# Patient Record
Sex: FEMALE | Race: BLACK OR AFRICAN AMERICAN | NOT HISPANIC OR LATINO | URBAN - METROPOLITAN AREA
[De-identification: names, ages, dates, MRNs, and addresses within clinical notes are randomized per-mention and may not be internally consistent; named-entity substitution may affect disease eponyms.]

---

## 2023-03-06 ENCOUNTER — TELEPHONE (OUTPATIENT)
Dept: PRIMARY CARE | Facility: CLINIC | Age: 73
End: 2023-03-06
Payer: MEDICARE

## 2023-03-13 ENCOUNTER — TELEPHONE (OUTPATIENT)
Dept: PRIMARY CARE | Facility: CLINIC | Age: 73
End: 2023-03-13
Payer: MEDICARE

## 2023-03-14 PROBLEM — R73.09 OTHER ABNORMAL GLUCOSE: Status: ACTIVE | Noted: 2023-03-14

## 2023-03-14 PROBLEM — E78.5 HYPERLIPIDEMIA WITH LOW HDL: Status: ACTIVE | Noted: 2023-03-14

## 2023-03-14 PROBLEM — N95.8 OTHER SPECIFIED MENOPAUSAL AND PERIMENOPAUSAL DISORDERS: Status: ACTIVE | Noted: 2023-03-14

## 2023-03-14 PROBLEM — E55.9 VITAMIN D INSUFFICIENCY: Status: ACTIVE | Noted: 2023-03-14

## 2023-03-14 PROBLEM — R59.1 LYMPHADENOPATHY: Status: ACTIVE | Noted: 2023-03-14

## 2023-03-14 PROBLEM — M81.0 OSTEOPOROSIS: Status: ACTIVE | Noted: 2023-03-14

## 2023-03-14 PROBLEM — N64.89 BREAST ASYMMETRY: Status: ACTIVE | Noted: 2023-03-14

## 2023-03-14 PROBLEM — R91.8 LUNG NODULE, MULTIPLE: Status: ACTIVE | Noted: 2023-03-14

## 2023-03-14 PROBLEM — I10 HYPERTENSION, ESSENTIAL: Status: ACTIVE | Noted: 2023-03-14

## 2023-03-14 PROBLEM — E78.6 HYPERLIPIDEMIA WITH LOW HDL: Status: ACTIVE | Noted: 2023-03-14

## 2023-03-14 RX ORDER — ATORVASTATIN CALCIUM 20 MG/1
20 TABLET, FILM COATED ORAL NIGHTLY
COMMUNITY
Start: 2022-11-24 | End: 2023-03-15 | Stop reason: SINTOL

## 2023-03-14 RX ORDER — LISINOPRIL 2.5 MG/1
2.5 TABLET ORAL DAILY
COMMUNITY
End: 2023-03-15 | Stop reason: SDUPTHER

## 2023-03-14 RX ORDER — AMLODIPINE BESYLATE 10 MG/1
10 TABLET ORAL DAILY
COMMUNITY
End: 2023-03-15 | Stop reason: SINTOL

## 2023-03-14 RX ORDER — ACETAMINOPHEN 500 MG
TABLET ORAL
COMMUNITY

## 2023-03-14 RX ORDER — MULTIVITAMIN
1 TABLET ORAL DAILY
COMMUNITY
Start: 2015-01-27 | End: 2023-10-12 | Stop reason: ALTCHOICE

## 2023-03-14 NOTE — PROGRESS NOTES
"Subjective   Patient ID: Lexx Mitchell is a 72 y.o. female who presents for Dizziness.    Went to  10 days ago    Stopped Amlodipine and Atorvastatin for side effects    Dizziness  This is a new problem. The current episode started 1 to 4 weeks ago. The problem occurs 2 to 4 times per day. The problem has been gradually improving. Nothing aggravates the symptoms. She has tried drinking for the symptoms. The treatment provided mild relief.        Review of Systems  Constitutional: No fever or chills  Cardiovascular: no chest pain, no palpitations and no syncope.   Respiratory: no cough, no shortness of breath during exertion and no shortness of breath at rest.   Gastrointestinal: no abdominal pain, no nausea and no vomiting.  Neuro: No Headache, no dizziness    Objective   /82   Pulse 83   Ht 1.619 m (5' 3.75\")   Wt 66.7 kg (147 lb)   SpO2 94%   BMI 25.43 kg/m²     Physical Exam  Constitutional: Alert and in no acute distress. Well developed, well nourished  Head and Face: Head and face: Normal.    Cardiovascular: Heart rate and rhythm were normal, normal S1 and S2. No peripheral edema.   Pulmonary: No respiratory distress. Clear bilateral breath sounds.  Musculoskeletal: Gait and station: Normal. Muscle strength/tone: Normal.   Skin: Normal skin color and pigmentation, normal skin turgor, and no rash.    Psychiatric: Judgment and insight: Intact. Mood and affect: Normal.    Assessment/Plan   Diagnoses and all orders for this visit:  Hypertension, essential  -     Comprehensive Metabolic Panel; Future  -     Follow Up In [facility] - PCP; Future  -     lisinopril 2.5 mg tablet; Take 1 tablet (2.5 mg) by mouth once daily.  Hyperlipidemia with low HDL  -     rosuvastatin (Crestor) 5 mg tablet; Take 1 tablet (5 mg) by mouth every other day.  -     Lipid Panel; Future        Dear Lexx Mitchell     It was my pleasure to take care of you today in the office. Below are the things we discussed " today:    1. HTN - Stop Amlodipine and continue Lisinopril    2. Dizziness - Improved will stay off amlodipine and maintain good oral hydration    3. Hyperlipidemia - Stop Atorvastatin, start rosuvastatin      Follow up in 2 months    Your yearly Physical is due in: Nov 2023  When you call the office for your yearly Physical, please ask them to inform me to order your blood work, so that you can get the fasting blood work before your appointment and we can discuss the results at your physical.      Please call me if any questions arise from now until your next visit. I will call you after I am done seeing patients. A Doctor is always available by phone when the office is closed. Please feel free to call for help with any problem that you feel shouldn't wait until the office re-opens.     Gabbi Tran MD

## 2023-03-15 ENCOUNTER — OFFICE VISIT (OUTPATIENT)
Dept: PRIMARY CARE | Facility: CLINIC | Age: 73
End: 2023-03-15
Payer: MEDICARE

## 2023-03-15 VITALS
WEIGHT: 147 LBS | BODY MASS INDEX: 25.1 KG/M2 | SYSTOLIC BLOOD PRESSURE: 137 MMHG | HEART RATE: 83 BPM | OXYGEN SATURATION: 94 % | DIASTOLIC BLOOD PRESSURE: 82 MMHG | HEIGHT: 64 IN

## 2023-03-15 DIAGNOSIS — E78.6 HYPERLIPIDEMIA WITH LOW HDL: ICD-10-CM

## 2023-03-15 DIAGNOSIS — E78.5 HYPERLIPIDEMIA WITH LOW HDL: ICD-10-CM

## 2023-03-15 DIAGNOSIS — I10 HYPERTENSION, ESSENTIAL: Primary | ICD-10-CM

## 2023-03-15 PROCEDURE — 3079F DIAST BP 80-89 MM HG: CPT | Performed by: FAMILY MEDICINE

## 2023-03-15 PROCEDURE — 99214 OFFICE O/P EST MOD 30 MIN: CPT | Performed by: FAMILY MEDICINE

## 2023-03-15 PROCEDURE — 3075F SYST BP GE 130 - 139MM HG: CPT | Performed by: FAMILY MEDICINE

## 2023-03-15 RX ORDER — ROSUVASTATIN CALCIUM 5 MG/1
5 TABLET, COATED ORAL EVERY OTHER DAY
Qty: 45 TABLET | Refills: 1 | Status: SHIPPED | OUTPATIENT
Start: 2023-03-15 | End: 2023-03-28

## 2023-03-15 RX ORDER — LISINOPRIL 2.5 MG/1
2.5 TABLET ORAL DAILY
Qty: 90 TABLET | Refills: 0 | Status: SHIPPED | OUTPATIENT
Start: 2023-03-15 | End: 2023-05-31

## 2023-03-15 ASSESSMENT — PATIENT HEALTH QUESTIONNAIRE - PHQ9
1. LITTLE INTEREST OR PLEASURE IN DOING THINGS: NOT AT ALL
2. FEELING DOWN, DEPRESSED OR HOPELESS: NOT AT ALL
SUM OF ALL RESPONSES TO PHQ9 QUESTIONS 1 AND 2: 0

## 2023-03-15 ASSESSMENT — ENCOUNTER SYMPTOMS
LOSS OF SENSATION IN FEET: 0
OCCASIONAL FEELINGS OF UNSTEADINESS: 0
DEPRESSION: 0
DIZZINESS: 1

## 2023-03-27 ENCOUNTER — APPOINTMENT (OUTPATIENT)
Dept: PRIMARY CARE | Facility: CLINIC | Age: 73
End: 2023-03-27
Payer: MEDICARE

## 2023-03-28 ENCOUNTER — OFFICE VISIT (OUTPATIENT)
Dept: PRIMARY CARE | Facility: CLINIC | Age: 73
End: 2023-03-28
Payer: MEDICARE

## 2023-03-28 VITALS
SYSTOLIC BLOOD PRESSURE: 130 MMHG | HEART RATE: 81 BPM | WEIGHT: 149 LBS | DIASTOLIC BLOOD PRESSURE: 82 MMHG | BODY MASS INDEX: 25.78 KG/M2 | OXYGEN SATURATION: 98 %

## 2023-03-28 DIAGNOSIS — J31.0 CHRONIC RHINITIS: Primary | ICD-10-CM

## 2023-03-28 DIAGNOSIS — Z12.11 COLON CANCER SCREENING: ICD-10-CM

## 2023-03-28 DIAGNOSIS — R92.2 INCONCLUSIVE MAMMOGRAM: ICD-10-CM

## 2023-03-28 DIAGNOSIS — I10 PRIMARY HYPERTENSION: ICD-10-CM

## 2023-03-28 DIAGNOSIS — N63.10 MASSES OF BOTH BREASTS: ICD-10-CM

## 2023-03-28 DIAGNOSIS — N63.20 MASSES OF BOTH BREASTS: ICD-10-CM

## 2023-03-28 PROCEDURE — 99204 OFFICE O/P NEW MOD 45 MIN: CPT | Performed by: STUDENT IN AN ORGANIZED HEALTH CARE EDUCATION/TRAINING PROGRAM

## 2023-03-28 PROCEDURE — 3079F DIAST BP 80-89 MM HG: CPT | Performed by: STUDENT IN AN ORGANIZED HEALTH CARE EDUCATION/TRAINING PROGRAM

## 2023-03-28 PROCEDURE — 3075F SYST BP GE 130 - 139MM HG: CPT | Performed by: STUDENT IN AN ORGANIZED HEALTH CARE EDUCATION/TRAINING PROGRAM

## 2023-03-28 RX ORDER — AMLODIPINE BESYLATE 5 MG/1
5 TABLET ORAL DAILY
Qty: 90 TABLET | Refills: 1 | Status: SHIPPED | OUTPATIENT
Start: 2023-03-28 | End: 2023-10-04 | Stop reason: SDUPTHER

## 2023-03-28 RX ORDER — TRIAMCINOLONE ACETONIDE 55 UG/1
1 SPRAY, METERED NASAL DAILY
Qty: 10.8 ML | Refills: 3 | Status: ON HOLD | OUTPATIENT
Start: 2023-03-28 | End: 2023-11-13 | Stop reason: WASHOUT

## 2023-03-28 NOTE — PATIENT INSTRUCTIONS
Thank you for coming in today Lexx!    Please stop your statin. We will repeat the calcium score cardiac CT next year.    Please take lisinopril 5mg in the morning and amlodipine 5mg in the evening. Let me know if you have side effects.    Please get your mammogram/ultrasound scheduled as well as your colonoscopy.     You can get Tdap vaccine here or at the pharmacy.    I will see you in August or sooner if needed!

## 2023-03-28 NOTE — PROGRESS NOTES
Subjective   Patient ID: Lexx Mitchell is a 72 y.o. female who presents for Establish Care, Vomiting, Dizziness, and Shortness of Breath. Seen with sister.     HPI  Concerns today:  States she has had a month of issues that she thinks are related to medications. Started off on atorvastatin and had vomiting and diarrhea. Then started amlodipine, and started to develop wooziness and pressure and lightheadedness. Has had this two or three times a week. At night, would have a hard time breathing. Cannot lay flat. Feels like the issues with her breathing are from her throat up. She is worried it might have to do with her sinuses. Humidifier is helping a little bit. No swelling in legs.    Chronic issues:  #HTN  -On lisinopril 5 mg daily   -BP running in 140s-150s- at home  -Initially 184/80 when she arrived today, improved on repeat to 130/82  -Calcium score 0 on 5/3/2021    Health maintenance:  -Planning on going on Mediterranean diet and working out more   -Needs to go back for mammogram   -Due for colonoscopy   -Lung cancer screening in Dec 2022 showed scattered pulmonary nodules measuring up to 5mm- recommended follow up in 12 months  [ ] Due for Tdap -- plans to get at pharmacy   -Shingles, pneumonia vaccines up to date  -DEXA 4/24/21 showed osteoporosis of spine, osteopenia elsewhere    Social history:  -Lives alone  -Sister, aunt, cousin in [place]  -Sister in [place]   -Quit smoking in February 2023  -Occasional alcohol once a month  -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  -Gardening, yoga, walking     Objective   Visit Vitals  BP (!) 184/80   Pulse 81   Wt 67.6 kg (149 lb)   SpO2 98%   BMI 25.78 kg/m²   OB Status Postmenopausal   Smoking Status Former   BSA 1.74 m²      Physical Exam  General: Well appearing, conversational, in no acute distress  HEENT: EOMI, PERRL, mild nasal congestion, MMM, TMs clear bilaterally   CV: RRR, no murmurs  Resp: Lungs CTAB, normal work of breathing  GI: Soft, nondistended,  nontender, BS+   Ext: No lower ext swelling  Skin: Warm, dry, no rashes  Neuro: Awake, alert, oriented x3, moving all 4 extremities, nonfocal, normal gait, ambulates without assistance  Psych: Appropriate mood and affect      Assessment/Plan   Lexx Mitchell is a 72 y.o. female who presents for Establish Care, Vomiting, Dizziness, and Shortness of Breath. Patient is overall well appearing. She thinks she may have had side effects of nausea and dizziness from statins, so will hold for now since her calcium score was 0 a couple years ago. Will repeat a calcium score when she gets her CT lung cancer screening in September. Her blood pressure is still elevated at home, so will try lisinopril 5mg and amlodipine 5mg since she prefers to be on low doses of the medications. She is having trouble breathing out of her nose at night. Her humidifier is helping some, so will add a nasal spray as well.     Chronic issues:  #HTN  -On lisinopril 5 mg daily   -BP running in 140s-150s- at home  -Initially 184/80 when she arrived today, improved on repeat to 130/82  -Add amlodipine 5mg     Health maintenance:  -Needs to go back for mammogram -- ordered  -Due for colonoscopy -- ordered   -Lung cancer screening in Dec 2022 showed scattered pulmonary nodules measuring up to 5mm- recommended follow up in 12 months  - Repeat calcium score cardiac CT in Dec as well  [ ] Due for Tdap -- plans to get at pharmacy   -Shingles, pneumonia vaccines up to date  -DEXA 4/24/21 showed osteoporosis of spine, osteopenia elsewhere- patient was prescribed alendronate previously  -Will repeat DEXA next year       Problem List Items Addressed This Visit    None  Visit Diagnoses       Chronic rhinitis    -  Primary    Relevant Medications    triamcinolone (Nasacort) 55 mcg nasal inhaler    Primary hypertension        Relevant Medications    amLODIPine (Norvasc) 5 mg tablet    Colon cancer screening        Relevant Orders    Colonoscopy    Masses of both  breasts        Relevant Orders    BI mammo bilateral diagnostic    BI US breast complete bilateral    Inconclusive mammogram        Relevant Orders    BI mammo bilateral diagnostic    BI US breast complete bilateral            Lisinopril 5mg daily  Has not started rosuvastatin  Cardiac calcium score 0 in 2021    Hold off on statin    Planning on going on mediterranean diet         Vanessa Quinn MD MPH

## 2023-04-25 ENCOUNTER — OFFICE VISIT (OUTPATIENT)
Dept: PRIMARY CARE | Facility: CLINIC | Age: 73
End: 2023-04-25
Payer: MEDICARE

## 2023-04-25 VITALS
WEIGHT: 152 LBS | BODY MASS INDEX: 26.3 KG/M2 | DIASTOLIC BLOOD PRESSURE: 70 MMHG | HEART RATE: 92 BPM | SYSTOLIC BLOOD PRESSURE: 132 MMHG | OXYGEN SATURATION: 98 %

## 2023-04-25 DIAGNOSIS — T30.0 BURN: Primary | ICD-10-CM

## 2023-04-25 PROCEDURE — 3078F DIAST BP <80 MM HG: CPT | Performed by: STUDENT IN AN ORGANIZED HEALTH CARE EDUCATION/TRAINING PROGRAM

## 2023-04-25 PROCEDURE — 3075F SYST BP GE 130 - 139MM HG: CPT | Performed by: STUDENT IN AN ORGANIZED HEALTH CARE EDUCATION/TRAINING PROGRAM

## 2023-04-25 PROCEDURE — 99213 OFFICE O/P EST LOW 20 MIN: CPT | Performed by: STUDENT IN AN ORGANIZED HEALTH CARE EDUCATION/TRAINING PROGRAM

## 2023-04-25 RX ORDER — SILVER SULFADIAZINE 10 G/1000G
CREAM TOPICAL
Qty: 50 G | Refills: 0 | Status: SHIPPED | OUTPATIENT
Start: 2023-04-25 | End: 2023-06-24
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
## 2023-05-08 ENCOUNTER — APPOINTMENT (OUTPATIENT)
Dept: PRIMARY CARE | Facility: CLINIC | Age: 73
End: 2023-05-08
Payer: MEDICARE

## 2023-05-31 DIAGNOSIS — I10 HYPERTENSION, ESSENTIAL: ICD-10-CM

## 2023-05-31 RX ORDER — LISINOPRIL 2.5 MG/1
TABLET ORAL
Qty: 90 TABLET | Refills: 0 | Status: SHIPPED | OUTPATIENT
Start: 2023-05-31 | End: 2023-08-22

## 2023-06-29 DIAGNOSIS — R92.8 OTHER ABNORMAL AND INCONCLUSIVE FINDINGS ON DIAGNOSTIC IMAGING OF BREAST: ICD-10-CM

## 2023-06-29 DIAGNOSIS — N63.10 MASSES OF BOTH BREASTS: Primary | ICD-10-CM

## 2023-06-29 DIAGNOSIS — N63.20 MASSES OF BOTH BREASTS: Primary | ICD-10-CM

## 2023-08-07 ENCOUNTER — OFFICE VISIT (OUTPATIENT)
Dept: PRIMARY CARE | Facility: CLINIC | Age: 73
End: 2023-08-07
Payer: MEDICARE

## 2023-08-07 VITALS
HEART RATE: 95 BPM | DIASTOLIC BLOOD PRESSURE: 80 MMHG | OXYGEN SATURATION: 98 % | SYSTOLIC BLOOD PRESSURE: 132 MMHG | WEIGHT: 150 LBS | BODY MASS INDEX: 25.95 KG/M2

## 2023-08-07 DIAGNOSIS — N18.2 CKD (CHRONIC KIDNEY DISEASE) STAGE 2, GFR 60-89 ML/MIN: ICD-10-CM

## 2023-08-07 DIAGNOSIS — Z01.818 PREOPERATIVE CLEARANCE: Primary | ICD-10-CM

## 2023-08-07 DIAGNOSIS — Z71.6 ENCOUNTER FOR SMOKING CESSATION COUNSELING: ICD-10-CM

## 2023-08-07 DIAGNOSIS — E78.6 HYPERLIPIDEMIA WITH LOW HDL: ICD-10-CM

## 2023-08-07 DIAGNOSIS — E78.5 HYPERLIPIDEMIA WITH LOW HDL: ICD-10-CM

## 2023-08-07 DIAGNOSIS — Z78.9 STATIN INTOLERANCE: ICD-10-CM

## 2023-08-07 DIAGNOSIS — R79.9 ABNORMAL FINDING OF BLOOD CHEMISTRY, UNSPECIFIED: ICD-10-CM

## 2023-08-07 DIAGNOSIS — E55.9 VITAMIN D DEFICIENCY: ICD-10-CM

## 2023-08-07 DIAGNOSIS — I10 HYPERTENSION, ESSENTIAL: ICD-10-CM

## 2023-08-07 PROCEDURE — 3075F SYST BP GE 130 - 139MM HG: CPT | Performed by: STUDENT IN AN ORGANIZED HEALTH CARE EDUCATION/TRAINING PROGRAM

## 2023-08-07 PROCEDURE — 3079F DIAST BP 80-89 MM HG: CPT | Performed by: STUDENT IN AN ORGANIZED HEALTH CARE EDUCATION/TRAINING PROGRAM

## 2023-08-07 PROCEDURE — 93000 ELECTROCARDIOGRAM COMPLETE: CPT | Performed by: STUDENT IN AN ORGANIZED HEALTH CARE EDUCATION/TRAINING PROGRAM

## 2023-08-07 PROCEDURE — 99214 OFFICE O/P EST MOD 30 MIN: CPT | Performed by: STUDENT IN AN ORGANIZED HEALTH CARE EDUCATION/TRAINING PROGRAM

## 2023-08-07 NOTE — PATIENT INSTRUCTIONS
Thank you for coming today Lexx!    Please schedule your CT cardiac score to look at calcium in your heart. You can get this done on the lower level in [address]. The phone number is [phone].     Please get your blood work done. The lab is on floor 1 in [address] or on floor LL in [address].     We discussed scheduling your colonoscopy. Please schedule your colonoscopy by calling [phone].     I have made a referral for [facility] for quitting smoking. Their number is [phone]. They are located at [address] [place] [address] near [facility].     I will see you in November for your wellness visit!
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
## 2023-08-22 DIAGNOSIS — I10 HYPERTENSION, ESSENTIAL: ICD-10-CM

## 2023-08-22 RX ORDER — LISINOPRIL 2.5 MG/1
2.5 TABLET ORAL DAILY
Qty: 90 TABLET | Refills: 0 | Status: SHIPPED | OUTPATIENT
Start: 2023-08-22

## 2023-09-20 ENCOUNTER — LAB (OUTPATIENT)
Dept: LAB | Facility: LAB | Age: 73
End: 2023-09-20
Payer: MEDICARE

## 2023-09-20 DIAGNOSIS — E55.9 VITAMIN D DEFICIENCY: ICD-10-CM

## 2023-09-20 DIAGNOSIS — R79.9 ABNORMAL FINDING OF BLOOD CHEMISTRY, UNSPECIFIED: ICD-10-CM

## 2023-09-20 DIAGNOSIS — E78.6 HYPERLIPIDEMIA WITH LOW HDL: ICD-10-CM

## 2023-09-20 DIAGNOSIS — E78.5 HYPERLIPIDEMIA WITH LOW HDL: ICD-10-CM

## 2023-09-20 DIAGNOSIS — Z01.818 PREOPERATIVE CLEARANCE: ICD-10-CM

## 2023-09-20 LAB
ALANINE AMINOTRANSFERASE (SGPT) (U/L) IN SER/PLAS: 7 U/L (ref 7–45)
ALBUMIN (G/DL) IN SER/PLAS: 4.3 G/DL (ref 3.4–5)
ALKALINE PHOSPHATASE (U/L) IN SER/PLAS: 68 U/L (ref 33–136)
ANION GAP IN SER/PLAS: 13 MMOL/L (ref 10–20)
ASPARTATE AMINOTRANSFERASE (SGOT) (U/L) IN SER/PLAS: 13 U/L (ref 9–39)
BILIRUBIN TOTAL (MG/DL) IN SER/PLAS: 0.4 MG/DL (ref 0–1.2)
CALCIDIOL (25 OH VITAMIN D3) (NG/ML) IN SER/PLAS: 27 NG/ML
CALCIUM (MG/DL) IN SER/PLAS: 9.7 MG/DL (ref 8.6–10.6)
CARBON DIOXIDE, TOTAL (MMOL/L) IN SER/PLAS: 27 MMOL/L (ref 21–32)
CHLORIDE (MMOL/L) IN SER/PLAS: 108 MMOL/L (ref 98–107)
CHOLESTEROL (MG/DL) IN SER/PLAS: 249 MG/DL (ref 0–199)
CHOLESTEROL IN HDL (MG/DL) IN SER/PLAS: 34.8 MG/DL
CHOLESTEROL/HDL RATIO: 7.2
CREATININE (MG/DL) IN SER/PLAS: 0.93 MG/DL (ref 0.5–1.05)
ERYTHROCYTE DISTRIBUTION WIDTH (RATIO) BY AUTOMATED COUNT: 17.6 % (ref 11.5–14.5)
ERYTHROCYTE MEAN CORPUSCULAR HEMOGLOBIN CONCENTRATION (G/DL) BY AUTOMATED: 30.7 G/DL (ref 32–36)
ERYTHROCYTE MEAN CORPUSCULAR VOLUME (FL) BY AUTOMATED COUNT: 84 FL (ref 80–100)
ERYTHROCYTES (10*6/UL) IN BLOOD BY AUTOMATED COUNT: 4.67 X10E12/L (ref 4–5.2)
ESTIMATED AVERAGE GLUCOSE FOR HBA1C: 126 MG/DL
GFR FEMALE: 66 ML/MIN/1.73M2
GLUCOSE (MG/DL) IN SER/PLAS: 89 MG/DL (ref 74–99)
HEMATOCRIT (%) IN BLOOD BY AUTOMATED COUNT: 39.1 % (ref 36–46)
HEMOGLOBIN (G/DL) IN BLOOD: 12 G/DL (ref 12–16)
HEMOGLOBIN A1C/HEMOGLOBIN TOTAL IN BLOOD: 6 %
LDL: 187 MG/DL (ref 0–99)
LEUKOCYTES (10*3/UL) IN BLOOD BY AUTOMATED COUNT: 5.3 X10E9/L (ref 4.4–11.3)
NRBC (PER 100 WBCS) BY AUTOMATED COUNT: 0 /100 WBC (ref 0–0)
PLATELETS (10*3/UL) IN BLOOD AUTOMATED COUNT: 276 X10E9/L (ref 150–450)
POTASSIUM (MMOL/L) IN SER/PLAS: 4.3 MMOL/L (ref 3.5–5.3)
PROTEIN TOTAL: 7.6 G/DL (ref 6.4–8.2)
SODIUM (MMOL/L) IN SER/PLAS: 144 MMOL/L (ref 136–145)
TRIGLYCERIDE (MG/DL) IN SER/PLAS: 137 MG/DL (ref 0–149)
UREA NITROGEN (MG/DL) IN SER/PLAS: 11 MG/DL (ref 6–23)
VLDL: 27 MG/DL (ref 0–40)

## 2023-09-20 PROCEDURE — 80053 COMPREHEN METABOLIC PANEL: CPT

## 2023-09-20 PROCEDURE — 80061 LIPID PANEL: CPT

## 2023-09-20 PROCEDURE — 85027 COMPLETE CBC AUTOMATED: CPT

## 2023-09-20 PROCEDURE — 82306 VITAMIN D 25 HYDROXY: CPT

## 2023-09-20 PROCEDURE — 83036 HEMOGLOBIN GLYCOSYLATED A1C: CPT

## 2023-09-20 PROCEDURE — 36415 COLL VENOUS BLD VENIPUNCTURE: CPT

## 2023-09-21 ENCOUNTER — LAB (OUTPATIENT)
Dept: LAB | Facility: LAB | Age: 73
End: 2023-09-21
Payer: MEDICARE

## 2023-09-21 DIAGNOSIS — I10 HYPERTENSION, ESSENTIAL: ICD-10-CM

## 2023-09-21 DIAGNOSIS — N18.2 CKD (CHRONIC KIDNEY DISEASE) STAGE 2, GFR 60-89 ML/MIN: ICD-10-CM

## 2023-09-21 LAB
APPEARANCE, URINE: CLEAR
BILIRUBIN, URINE: NEGATIVE
BLOOD, URINE: ABNORMAL
COLOR, URINE: ABNORMAL
GLUCOSE, URINE: NEGATIVE MG/DL
KETONES, URINE: NEGATIVE MG/DL
LEUKOCYTE ESTERASE, URINE: NEGATIVE
MUCUS, URINE: NORMAL /LPF
NITRITE, URINE: NEGATIVE
PH, URINE: 5 (ref 5–8)
PROTEIN, URINE: NEGATIVE MG/DL
RBC, URINE: 1 /HPF (ref 0–5)
SPECIFIC GRAVITY, URINE: 1 (ref 1–1.03)
SQUAMOUS EPITHELIAL CELLS, URINE: <1 /HPF
UROBILINOGEN, URINE: <2 MG/DL (ref 0–1.9)
WBC, URINE: 1 /HPF (ref 0–5)

## 2023-09-21 PROCEDURE — 81001 URINALYSIS AUTO W/SCOPE: CPT

## 2023-09-25 ENCOUNTER — HOSPITAL ENCOUNTER (OUTPATIENT)
Dept: DATA CONVERSION | Facility: HOSPITAL | Age: 73
End: 2023-09-25
Attending: SURGERY | Admitting: SURGERY
Payer: MEDICARE

## 2023-09-25 DIAGNOSIS — N63.10 UNSPECIFIED LUMP IN THE RIGHT BREAST, UNSPECIFIED QUADRANT: ICD-10-CM

## 2023-09-25 DIAGNOSIS — D05.11 INTRADUCTAL CARCINOMA IN SITU OF RIGHT BREAST: ICD-10-CM

## 2023-09-25 LAB
ANION GAP IN SER/PLAS: 11 MMOL/L (ref 10–20)
CALCIUM (MG/DL) IN SER/PLAS: 9.1 MG/DL (ref 8.6–10.3)
CARBON DIOXIDE, TOTAL (MMOL/L) IN SER/PLAS: 25 MMOL/L (ref 21–32)
CHLORIDE (MMOL/L) IN SER/PLAS: 109 MMOL/L (ref 98–107)
CREATININE (MG/DL) IN SER/PLAS: 0.86 MG/DL (ref 0.5–1.05)
GFR FEMALE: 72 ML/MIN/1.73M2
GLUCOSE (MG/DL) IN SER/PLAS: 101 MG/DL (ref 74–99)
HEMATOCRIT (%) IN BLOOD BY AUTOMATED COUNT: 36.3 % (ref 36–46)
HEMOGLOBIN (G/DL) IN BLOOD: 11.4 G/DL (ref 12–16)
POTASSIUM (MMOL/L) IN SER/PLAS: 3.4 MMOL/L (ref 3.5–5.3)
SODIUM (MMOL/L) IN SER/PLAS: 142 MMOL/L (ref 136–145)
UREA NITROGEN (MG/DL) IN SER/PLAS: 10 MG/DL (ref 6–23)

## 2023-09-29 VITALS
SYSTOLIC BLOOD PRESSURE: 183 MMHG | TEMPERATURE: 97.9 F | DIASTOLIC BLOOD PRESSURE: 77 MMHG | HEART RATE: 68 BPM | RESPIRATION RATE: 18 BRPM

## 2023-09-30 NOTE — H&P
History of Present Illness:   History Present Illness:  Reason for surgery: right breast atypical papillary  lesion   HPI:    73 F with screen detected right breast mass at 9:00, 11cm FN, core needle biopsy showing atypical  papillary lesion, suspicious for low grade DCIS.     Allergies:        Allergies:  ·  No Known Allergies :     Home Medication Review:   Home Medications Reviewed: yes     Impression/Procedure:   ·  Impression and Planned Procedure: right breast Magseed localized partial mastectomy       ERAS (Enhanced Recovery After Surgery):  ·  ERAS Patient: no     Review of Systems:   Review of Systems:  All Other Systems: All other systems reviewed and  are negative       Vital Signs:  Temperature C: 36.6 degrees C   Temperature F: 97.8 degrees F   Heart Rate: 68 beats per minute   Respiratory Rate: 18 breath per minute   Blood Pressure Systolic: 183 mm/Hg   Blood Pressure Diastolic: 77 mm/Hg     Physical Exam by System:    Constitutional: Well developed, awake/alert/oriented  x3, no distress, alert and cooperative   Respiratory/Thorax: CTA   Cardiovascular: RRR   Musculoskeletal: ROM intact   Extremities: normal extremities   Neurological: alert and oriented x3, intact senses   Psychological: Appropriate mood and behavior   Skin: Warm and dry     Consent:   COVID-19 Consent:  ·  COVID-19 Risk Consent Surgeon has reviewed key risks related to the risk of donna COVID-19 and if they contract COVID-19 what the risks are.       Electronic Signatures:  Erica Bass)  (Signed 25-Sep-2023 07:41)   Authored: History of Present Illness, Allergies, Home  Medication Review, Impression/Procedure, ERAS, Review of Systems, Physical Exam, Consent, Note Completion      Last Updated: 25-Sep-2023 07:41 by Erica Bass)

## 2023-10-01 NOTE — OP NOTE
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 Right breast Magseed localized partial mastectomy   2. Right breast local tissue rearrangement     Anesthesia: Sunita Do  Estimated Blood Loss: 10 cc  Findings: right breast tissue with Magseed and biopsy clip in specimen on xray  Specimens(s) Collected: yes,  right breast tissue, shave cavity margins x6    Complications: none immediate  Patient Returned To/Condition: PACU/stable         Operative Report:   INDICATIONS FOR PROCEDURE:    Lexx Mitchell is a 73-year-old female who presented with a screen detected mass in her right breast for which core needle yielded an atypical papillary lesion bordering on low grade  DCIS. I personally reviewed her imaging and pathology. We met in surgical consultation, and I recommended right breast partial mastectomy. Following a detailed discussion regarding risks, benefits, and alternatives, informed consent was obtained, and  we agreed to proceed. Prior to her procedure today she had a magnetic seed (Magseed) placed in the previously biopsied right breast mass for intraoperative localization purposes.  My personal review of her post procedure images demonstrated the Magseed  in good position relative to the mass and biopsy clip.     DESCRIPTION OF PROCEDURE:    The patient was brought to the operating room and positioned supine on the OR table.  Following patient identification and a time-out procedure, SCDs were applied, and antibiotics  were administered.  General anesthesia was initiated.  I utilized the Sentimag probe to  confirm the placement of the Magseed within the breast.  The operative field was prepped and draped in the standard sterile fashion.      A far lateral incision was  chosen to hide the incision in a cosmetic location. 1% lidocaine was injected subcutaneously. An incision was made sharply.  Dissection was carried down through the skin and subcutaneous tissues. Soft tissue flaps were then  raised in all directions: superior, inferior, medial, and lateral. The mass was medial and deep to our incision; therefore, a tunneling technique was used. I identified the anterior mastectomy plane between the subcutaneous tissues and the anterior breast  parenchyma. I then developed the mastectomy flap widely up to and beyond the lesion. The Sentimag probe was used to identify the Magseed in the area targeted for excision, and a marking stitch was placed within it for dissection purposes. This area was  widely and circumferentially dissected from the surrounding tissues using electrocautery.  The specimen was excised and labeled as right breast tissue.  It was marked with a double short stitch superior, double long stitch lateral, and a single stitch  anterior. I personally inked all 6 margins of the entire specimen with the Vector kit using colors as prescribed. It was placed in the Trident machine for an intraoperative x-ray which demonstrated the biopsy clip and Magseed within the specimen. I personally  interpreted these images intraoperatively.     I then proceeded to take shave cavity margins circumferentially: inferior, superior, medial, lateral, anterior, and posterior. Each was removed, marked with a clip and ink on the new  margin, and sent separately to Pathology for permanent section. The excision cavity was copiously irrigated with warm sterile saline solution.  Hemostasis was achieved.  I then placed marking clips circumferentially around the cavity: superior, inferior,  medial, lateral, posterior and anterior.     The specimen measured 3.0 cm x 3.5 cm x 2.5 cm, and with shave cavity margins, there was a soft tissue defect of approximately 34 sq cm; therefore, local tissue  rearrangement was performed. Surrounding breast parenchyma was mobilized circumferentially  from the anterior mastectomy plane anteriorly and posteriorly from within the breast parenchyma. Hemostasis was confirmed. Once mobilized, the medial-superior and lateral-inferior pillars were advanced centrally and secured to each other with a 3-0 Vicryl  suture in an interrupted fashion to close the defect. The more superficial layer of tissue that had been mobilized in the breast at the beginning of the procedure was similarly closed in order to create a cosmetic effect.     0.5% Marcaine was injected subcutaneously for analgesia.  The incision was then closed in layers with an interrupted 3-0 Vicryl in the deep dermis followed by a running subcuticular 4-0 Monocryl for the skin. Skin glue, sterile dressings, fluffs, and  a surgical bra were then applied.     The patient tolerated the procedure well.  There were no immediate complications.  All counts were correct.  Estimated blood loss was 10 cc.  I was present for and performed the entire procedure.  The patient was then awakened and transported to the PACU  in stable condition.      Erica Bass MD  Breast Surgical Oncology   pager [phone]    Note Recipients:   Vanessa Quinn MD                        Attestation:   Note Completion:  Attending Attestation I performed the procedure without a resident         Electronic Signatures:  Erica Bass)  (Signed 25-Sep-2023 11:36)   Authored: Post-Operative Note, Chart Review, Note Completion      Last Updated: 25-Sep-2023 11:36 by Erica Bass)

## 2023-10-04 DIAGNOSIS — I10 PRIMARY HYPERTENSION: ICD-10-CM

## 2023-10-04 LAB
COMPLETE PATHOLOGY REPORT: NORMAL
CONVERTED ADDENDUM DIAGNOSIS 2: NORMAL
CONVERTED CLINICAL DIAGNOSIS-HISTORY: NORMAL
CONVERTED FINAL DIAGNOSIS: NORMAL
CONVERTED FINAL REPORT PDF LINK TO COPY AND PASTE: NORMAL
CONVERTED GROSS DESCRIPTION: NORMAL
CONVERTED SYNOPTIC DIAGNOSIS: NORMAL

## 2023-10-04 RX ORDER — AMLODIPINE BESYLATE 5 MG/1
5 TABLET ORAL DAILY
Qty: 90 TABLET | Refills: 1 | Status: SHIPPED | OUTPATIENT
Start: 2023-10-04 | End: 2024-03-17

## 2023-10-05 ENCOUNTER — TELEPHONE (OUTPATIENT)
Dept: SURGICAL ONCOLOGY | Facility: CLINIC | Age: 73
End: 2023-10-05
Payer: MEDICARE

## 2023-10-12 ENCOUNTER — OFFICE VISIT (OUTPATIENT)
Dept: SURGICAL ONCOLOGY | Facility: CLINIC | Age: 73
End: 2023-10-12
Payer: MEDICARE

## 2023-10-12 VITALS
BODY MASS INDEX: 26.13 KG/M2 | WEIGHT: 151.01 LBS | SYSTOLIC BLOOD PRESSURE: 147 MMHG | DIASTOLIC BLOOD PRESSURE: 85 MMHG | HEART RATE: 90 BPM | TEMPERATURE: 97.9 F

## 2023-10-12 DIAGNOSIS — Z90.11 STATUS POST PARTIAL MASTECTOMY OF RIGHT BREAST: ICD-10-CM

## 2023-10-12 DIAGNOSIS — D05.11 DUCTAL CARCINOMA IN SITU (DCIS) OF RIGHT BREAST: Primary | ICD-10-CM

## 2023-10-12 PROCEDURE — 3079F DIAST BP 80-89 MM HG: CPT | Performed by: SURGERY

## 2023-10-12 PROCEDURE — 1126F AMNT PAIN NOTED NONE PRSNT: CPT | Performed by: SURGERY

## 2023-10-12 PROCEDURE — 1160F RVW MEDS BY RX/DR IN RCRD: CPT | Performed by: SURGERY

## 2023-10-12 PROCEDURE — 99024 POSTOP FOLLOW-UP VISIT: CPT | Performed by: SURGERY

## 2023-10-12 PROCEDURE — 3077F SYST BP >= 140 MM HG: CPT | Performed by: SURGERY

## 2023-10-12 PROCEDURE — 1159F MED LIST DOCD IN RCRD: CPT | Performed by: SURGERY

## 2023-10-12 RX ORDER — SODIUM CHLORIDE 9 MG/ML
100 INJECTION, SOLUTION INTRAVENOUS CONTINUOUS
Status: CANCELLED | OUTPATIENT
Start: 2023-10-12

## 2023-10-12 ASSESSMENT — ENCOUNTER SYMPTOMS
GASTROINTESTINAL NEGATIVE: 1
PSYCHIATRIC NEGATIVE: 1
HEMATOLOGIC/LYMPHATIC NEGATIVE: 1
EYES NEGATIVE: 1
NEUROLOGICAL NEGATIVE: 1
MUSCULOSKELETAL NEGATIVE: 1
ENDOCRINE NEGATIVE: 1
RESPIRATORY NEGATIVE: 1
CONSTITUTIONAL NEGATIVE: 1
CARDIOVASCULAR NEGATIVE: 1
ALLERGIC/IMMUNOLOGIC NEGATIVE: 1

## 2023-10-12 ASSESSMENT — PAIN SCALES - GENERAL: PAINLEVEL: 0-NO PAIN

## 2023-10-12 NOTE — PROGRESS NOTES
Subjective   Patient ID: Lexx Mitchell is a 73 y.o. female presenting for post-operative visit.    HPI  Lexx Mitchell is a pleasant 73 year old AA female s/p right breast Magseed localized partial mastectomy on 23 for right breast atypical papillary lesion bordering on DCIS. Final pathology showed DCIS, 2.8 cm, intermediate grade, 4/6 margins close and/or positive,  ER+ 100%, pTis, stage 0.     BREAST IMAGIN2023 Screening mammogram indicates BI-RADS Category 0, bilateral breast masses.      2023 Right breast diagnostic mammogram and bilateral breast ultrasound indicates BI-RADS Category 4. Left breast 11:00 8 cm from the nipple is an oval circumscribed hypoechoic mass measuring 1.3 x 0.6 x 0.9 cm, probably benign; recommend 6 months follow up. Right breast 11:00 5 cm from the nipple is an oval circumscribed hypoechoic mass measuring 0.6 x 0.4 x 0.6 cm, probably benign; recommend 6 month follow up. Right breast 9:00 11 cm from the nipple is an irregular mass with angular and indistinct margins measuring 1.3 x 0.6 x 1.3 cm, biopsy recommended.      On 2023, left breast, ultrasound guided core needle biopsy 4:00, 8cm FN showed fibroadenoma with usual ductal hyperplasia. Right breast, ultrasound guided core needle biopsy showed atypical papillary lesion bordering on low grade DCIS.       She presents for postoperative visit. She is recovering well from surgery. Has returned to most activities except aerobics.      FEMALE HISTORY: menarche age 14, , first birth age 29, she did not breastfeed, OCPs x 5 years, menopause age 44, no HRT, scattered fibroglandular tissue. Remote history of bilateral breast biopsies, benign per patient report.      FAMILY CANCER HISTORY:   Sister: Breast cancer age 38     Past Medical History:   Diagnosis Date    Benign paroxysmal vertigo, unspecified ear 2017    Benign paroxysmal positional vertigo, unspecified laterality    Dizziness and giddiness 2018     Disequilibrium    Local infection of the skin and subcutaneous tissue, unspecified 04/26/2019    Infection of skin, local    Major depressive disorder, single episode, moderate (CMS/HCC) 05/13/2019    Moderate single current episode of major depressive disorder    Other nonspecific abnormal finding of lung field 01/12/2021    Abnormal CT lung screening    Personal history of other endocrine, nutritional and metabolic disease 03/09/2021    History of hyperlipidemia    Personal history of other specified conditions 03/14/2016    History of vertigo     Current Outpatient Medications on File Prior to Visit   Medication Sig Dispense Refill    amLODIPine (Norvasc) 5 mg tablet Take 1 tablet (5 mg) by mouth once daily. 90 tablet 1    lisinopril 2.5 mg tablet TAKE 1 TABLET BY MOUTH ONCE DAILY. 90 tablet 0    triamcinolone (Nasacort) 55 mcg nasal inhaler Administer 1 spray into each nostril once daily. 10.8 mL 3    ascorbic acid (VITAMIN C ORAL)       cholecalciferol (Vitamin D-3) 50 mcg (2,000 unit) capsule Vitamin D3 50 MCG (2000 UT) Oral Capsule   Refills: 0       Active      [DISCONTINUED] multivitamin tablet Take 1 tablet by mouth once daily.       No current facility-administered medications on file prior to visit.         Review of Systems   Constitutional: Negative.    HENT: Negative.     Eyes: Negative.    Respiratory: Negative.     Cardiovascular: Negative.    Gastrointestinal: Negative.    Endocrine: Negative.    Genitourinary: Negative.    Musculoskeletal: Negative.    Skin: Negative.    Allergic/Immunologic: Negative.    Neurological: Negative.    Hematological: Negative.    Psychiatric/Behavioral: Negative.     All other systems reviewed and are negative.      Objective   Physical Exam  General: Otherwise healthy appearing. No acute distress.      HEENT: Conjunctiva well-colored, sclera non-icteric. Neck supple, trachea midline. No lymphadenopathy or thyromegaly.      Cardiovascular: Regular rate and rhythm.       Respiratory: Clear to auscultation bilaterally.      Breast: Exam performed in the sitting and supine positions. Right breast: 9:00, 13 cm FN, well-healing incision.      Abdomen: Soft, non-tender, non-distended.      Extremities: Atraumatic, no edema.      Neurologic: Motor and sensory grossly intact. Alert and oriented.       Lymphatic: No axillary, supraclavicular, or infraclavicular lymphadenopathy bilaterally.       Assessment/Plan   You are recovering very well from surgery and your incision is healing well.     Instructions:   1. Continue the arm exercises. Okay to gradually increase activity with your upper body.   2. Please continue to use Tylenol and/or ice for discomfort.     We discussed your pathology report. This showed ductal carcinoma in situ, which is very early stage breast cancer. Several of the margins contained cancer cells. I explained the guidelines for treatment, and the recommendation to return to the operating room to remove more tissue at the margins, which is called re-excision.     I explained the recommendations for radiation therapy and antiestrogen medication to complete the treatment for breast cancer.      All questions were answered in detail, and we are in agreement with the following plan:   1. Right breast re-excision partial mastectomy at [facility] 11/1/23.   2. Referral to radiation oncology (after surgery).     If you have any questions, concerns, or changes in your breast self-exam prior to our next visit, please call my office at the number below. I look forward to seeing you again soon.     Erica Bass MD   Breast Surgical Oncology   Department of Surgery [facility]   Office: [phone] (option #2)       **DISCLAIMER** Speech recognition software was used to create portions of this document. While an attempt at proofreading has been made, minor errors in transcription may be  present.    Diagnoses and all orders for this visit:  Ductal carcinoma in situ (DCIS) of right breast  -     Case Request Operating Room: Right breast re-excision partial mastectomy; Standing  Other orders  -     Place in outpatient/hospital ambulatory surgery; Standing  -     Full code; Standing  -     Vital Signs; Standing  -     Pulse oximetry, spot; Standing  -     Apply KARIN hose knee length; Standing  -     Apply sequential compression device; Standing  -     Insert peripheral IV; Standing  -     Saline lock IV; Standing  -     POCT pregnancy, urine; Standing  -     sodium chloride 0.9% infusion  -     ceFAZolin (Ancef) 2 g in dextrose 5 % in water (D5W) 50 mL IV

## 2023-10-12 NOTE — H&P (VIEW-ONLY)
Subjective   Patient ID: Lexx Mitchell is a 73 y.o. female presenting for post-operative visit.    HPI  Lexx Mitchell is a pleasant 73 year old AA female s/p right breast Magseed localized partial mastectomy on 23 for right breast atypical papillary lesion bordering on DCIS. Final pathology showed DCIS, 2.8 cm, intermediate grade, 4/6 margins close and/or positive,  ER+ 100%, pTis, stage 0.     BREAST IMAGIN2023 Screening mammogram indicates BI-RADS Category 0, bilateral breast masses.      2023 Right breast diagnostic mammogram and bilateral breast ultrasound indicates BI-RADS Category 4. Left breast 11:00 8 cm from the nipple is an oval circumscribed hypoechoic mass measuring 1.3 x 0.6 x 0.9 cm, probably benign; recommend 6 months follow up. Right breast 11:00 5 cm from the nipple is an oval circumscribed hypoechoic mass measuring 0.6 x 0.4 x 0.6 cm, probably benign; recommend 6 month follow up. Right breast 9:00 11 cm from the nipple is an irregular mass with angular and indistinct margins measuring 1.3 x 0.6 x 1.3 cm, biopsy recommended.      On 2023, left breast, ultrasound guided core needle biopsy 4:00, 8cm FN showed fibroadenoma with usual ductal hyperplasia. Right breast, ultrasound guided core needle biopsy showed atypical papillary lesion bordering on low grade DCIS.       She presents for postoperative visit. She is recovering well from surgery. Has returned to most activities except aerobics.      FEMALE HISTORY: menarche age 14, , first birth age 29, she did not breastfeed, OCPs x 5 years, menopause age 44, no HRT, scattered fibroglandular tissue. Remote history of bilateral breast biopsies, benign per patient report.      FAMILY CANCER HISTORY:   Sister: Breast cancer age 38     Past Medical History:   Diagnosis Date    Benign paroxysmal vertigo, unspecified ear 2017    Benign paroxysmal positional vertigo, unspecified laterality    Dizziness and giddiness 2018     Disequilibrium    Local infection of the skin and subcutaneous tissue, unspecified 04/26/2019    Infection of skin, local    Major depressive disorder, single episode, moderate (CMS/HCC) 05/13/2019    Moderate single current episode of major depressive disorder    Other nonspecific abnormal finding of lung field 01/12/2021    Abnormal CT lung screening    Personal history of other endocrine, nutritional and metabolic disease 03/09/2021    History of hyperlipidemia    Personal history of other specified conditions 03/14/2016    History of vertigo     Current Outpatient Medications on File Prior to Visit   Medication Sig Dispense Refill    amLODIPine (Norvasc) 5 mg tablet Take 1 tablet (5 mg) by mouth once daily. 90 tablet 1    lisinopril 2.5 mg tablet TAKE 1 TABLET BY MOUTH ONCE DAILY. 90 tablet 0    triamcinolone (Nasacort) 55 mcg nasal inhaler Administer 1 spray into each nostril once daily. 10.8 mL 3    ascorbic acid (VITAMIN C ORAL)       cholecalciferol (Vitamin D-3) 50 mcg (2,000 unit) capsule Vitamin D3 50 MCG (2000 UT) Oral Capsule   Refills: 0       Active      [DISCONTINUED] multivitamin tablet Take 1 tablet by mouth once daily.       No current facility-administered medications on file prior to visit.         Review of Systems   Constitutional: Negative.    HENT: Negative.     Eyes: Negative.    Respiratory: Negative.     Cardiovascular: Negative.    Gastrointestinal: Negative.    Endocrine: Negative.    Genitourinary: Negative.    Musculoskeletal: Negative.    Skin: Negative.    Allergic/Immunologic: Negative.    Neurological: Negative.    Hematological: Negative.    Psychiatric/Behavioral: Negative.     All other systems reviewed and are negative.      Objective   Physical Exam  General: Otherwise healthy appearing. No acute distress.      HEENT: Conjunctiva well-colored, sclera non-icteric. Neck supple, trachea midline. No lymphadenopathy or thyromegaly.      Cardiovascular: Regular rate and rhythm.       Respiratory: Clear to auscultation bilaterally.      Breast: Exam performed in the sitting and supine positions. Right breast: 9:00, 13 cm FN, well-healing incision.      Abdomen: Soft, non-tender, non-distended.      Extremities: Atraumatic, no edema.      Neurologic: Motor and sensory grossly intact. Alert and oriented.       Lymphatic: No axillary, supraclavicular, or infraclavicular lymphadenopathy bilaterally.       Assessment/Plan   You are recovering very well from surgery and your incision is healing well.     Instructions:   1. Continue the arm exercises. Okay to gradually increase activity with your upper body.   2. Please continue to use Tylenol and/or ice for discomfort.     We discussed your pathology report. This showed ductal carcinoma in situ, which is very early stage breast cancer. Several of the margins contained cancer cells. I explained the guidelines for treatment, and the recommendation to return to the operating room to remove more tissue at the margins, which is called re-excision.     I explained the recommendations for radiation therapy and antiestrogen medication to complete the treatment for breast cancer.      All questions were answered in detail, and we are in agreement with the following plan:   1. Right breast re-excision partial mastectomy at [facility] 11/1/23.   2. Referral to radiation oncology (after surgery).     If you have any questions, concerns, or changes in your breast self-exam prior to our next visit, please call my office at the number below. I look forward to seeing you again soon.     Erica Bass MD   Breast Surgical Oncology   Department of Surgery [facility]   Office: [phone] (option #2)       **DISCLAIMER** Speech recognition software was used to create portions of this document. While an attempt at proofreading has been made, minor errors in transcription may be  present.    Diagnoses and all orders for this visit:  Ductal carcinoma in situ (DCIS) of right breast  -     Case Request Operating Room: Right breast re-excision partial mastectomy; Standing  Other orders  -     Place in outpatient/hospital ambulatory surgery; Standing  -     Full code; Standing  -     Vital Signs; Standing  -     Pulse oximetry, spot; Standing  -     Apply KARIN hose knee length; Standing  -     Apply sequential compression device; Standing  -     Insert peripheral IV; Standing  -     Saline lock IV; Standing  -     POCT pregnancy, urine; Standing  -     sodium chloride 0.9% infusion  -     ceFAZolin (Ancef) 2 g in dextrose 5 % in water (D5W) 50 mL IV

## 2023-11-01 ENCOUNTER — ANESTHESIA (OUTPATIENT)
Dept: OPERATING ROOM | Facility: HOSPITAL | Age: 73
End: 2023-11-01
Payer: MEDICARE

## 2023-11-01 ENCOUNTER — HOSPITAL ENCOUNTER (OUTPATIENT)
Facility: HOSPITAL | Age: 73
Setting detail: OUTPATIENT SURGERY
Discharge: HOME | End: 2023-11-01
Attending: SURGERY | Admitting: SURGERY
Payer: MEDICARE

## 2023-11-01 ENCOUNTER — ANESTHESIA EVENT (OUTPATIENT)
Dept: OPERATING ROOM | Facility: HOSPITAL | Age: 73
End: 2023-11-01
Payer: MEDICARE

## 2023-11-01 ENCOUNTER — ANCILLARY PROCEDURE (OUTPATIENT)
Dept: RADIOLOGY | Facility: CLINIC | Age: 73
End: 2023-11-01
Payer: MEDICARE

## 2023-11-01 VITALS
TEMPERATURE: 97.2 F | HEIGHT: 63 IN | HEART RATE: 71 BPM | RESPIRATION RATE: 15 BRPM | BODY MASS INDEX: 26.52 KG/M2 | OXYGEN SATURATION: 96 % | DIASTOLIC BLOOD PRESSURE: 74 MMHG | SYSTOLIC BLOOD PRESSURE: 150 MMHG | WEIGHT: 149.69 LBS

## 2023-11-01 DIAGNOSIS — D05.11 DUCTAL CARCINOMA IN SITU (DCIS) OF RIGHT BREAST: ICD-10-CM

## 2023-11-01 DIAGNOSIS — R92.8 OTHER ABNORMAL AND INCONCLUSIVE FINDINGS ON DIAGNOSTIC IMAGING OF BREAST: ICD-10-CM

## 2023-11-01 PROCEDURE — 88307 TISSUE EXAM BY PATHOLOGIST: CPT | Performed by: SPECIALIST

## 2023-11-01 PROCEDURE — 2500000004 HC RX 250 GENERAL PHARMACY W/ HCPCS (ALT 636 FOR OP/ED): Performed by: NURSE ANESTHETIST, CERTIFIED REGISTERED

## 2023-11-01 PROCEDURE — 2500000005 HC RX 250 GENERAL PHARMACY W/O HCPCS: Performed by: SURGERY

## 2023-11-01 PROCEDURE — 7100000001 HC RECOVERY ROOM TIME - INITIAL BASE CHARGE: Performed by: SURGERY

## 2023-11-01 PROCEDURE — A19301 PR MASTECTOMY, PARTIAL: Performed by: NURSE ANESTHETIST, CERTIFIED REGISTERED

## 2023-11-01 PROCEDURE — 2500000005 HC RX 250 GENERAL PHARMACY W/O HCPCS: Performed by: NURSE ANESTHETIST, CERTIFIED REGISTERED

## 2023-11-01 PROCEDURE — 7100000009 HC PHASE TWO TIME - INITIAL BASE CHARGE: Performed by: SURGERY

## 2023-11-01 PROCEDURE — 88307 TISSUE EXAM BY PATHOLOGIST: CPT | Mod: TC | Performed by: SURGERY

## 2023-11-01 PROCEDURE — A19301 PR MASTECTOMY, PARTIAL: Performed by: ANESTHESIOLOGY

## 2023-11-01 PROCEDURE — 99100 ANES PT EXTEME AGE<1 YR&>70: CPT | Performed by: ANESTHESIOLOGY

## 2023-11-01 PROCEDURE — 19301 PARTIAL MASTECTOMY: CPT | Performed by: SURGERY

## 2023-11-01 PROCEDURE — 3600000009 HC OR TIME - EACH INCREMENTAL 1 MINUTE - PROCEDURE LEVEL FOUR: Performed by: SURGERY

## 2023-11-01 PROCEDURE — 7100000002 HC RECOVERY ROOM TIME - EACH INCREMENTAL 1 MINUTE: Performed by: SURGERY

## 2023-11-01 PROCEDURE — 7100000010 HC PHASE TWO TIME - EACH INCREMENTAL 1 MINUTE: Performed by: SURGERY

## 2023-11-01 PROCEDURE — 3600000004 HC OR TIME - INITIAL BASE CHARGE - PROCEDURE LEVEL FOUR: Performed by: SURGERY

## 2023-11-01 PROCEDURE — 2500000004 HC RX 250 GENERAL PHARMACY W/ HCPCS (ALT 636 FOR OP/ED): Performed by: SURGERY

## 2023-11-01 PROCEDURE — 3700000002 HC GENERAL ANESTHESIA TIME - EACH INCREMENTAL 1 MINUTE: Performed by: SURGERY

## 2023-11-01 PROCEDURE — 3700000001 HC GENERAL ANESTHESIA TIME - INITIAL BASE CHARGE: Performed by: SURGERY

## 2023-11-01 PROCEDURE — 14301 TIS TRNFR ANY 30.1-60 SQ CM: CPT | Performed by: SURGERY

## 2023-11-01 PROCEDURE — A4217 STERILE WATER/SALINE, 500 ML: HCPCS | Performed by: SURGERY

## 2023-11-01 PROCEDURE — 2720000007 HC OR 272 NO HCPCS: Performed by: SURGERY

## 2023-11-01 RX ORDER — ONDANSETRON HYDROCHLORIDE 2 MG/ML
4 INJECTION, SOLUTION INTRAVENOUS ONCE AS NEEDED
Status: DISCONTINUED | OUTPATIENT
Start: 2023-11-01 | End: 2023-11-01 | Stop reason: HOSPADM

## 2023-11-01 RX ORDER — GLYCOPYRROLATE 0.2 MG/ML
INJECTION INTRAMUSCULAR; INTRAVENOUS AS NEEDED
Status: DISCONTINUED | OUTPATIENT
Start: 2023-11-01 | End: 2023-11-01

## 2023-11-01 RX ORDER — PROPOFOL 10 MG/ML
INJECTION, EMULSION INTRAVENOUS AS NEEDED
Status: DISCONTINUED | OUTPATIENT
Start: 2023-11-01 | End: 2023-11-01

## 2023-11-01 RX ORDER — LIDOCAINE HYDROCHLORIDE 10 MG/ML
0.1 INJECTION, SOLUTION EPIDURAL; INFILTRATION; INTRACAUDAL; PERINEURAL ONCE
Status: DISCONTINUED | OUTPATIENT
Start: 2023-11-01 | End: 2023-11-01 | Stop reason: HOSPADM

## 2023-11-01 RX ORDER — DEXAMETHASONE SODIUM PHOSPHATE 4 MG/ML
INJECTION, SOLUTION INTRA-ARTICULAR; INTRALESIONAL; INTRAMUSCULAR; INTRAVENOUS; SOFT TISSUE AS NEEDED
Status: DISCONTINUED | OUTPATIENT
Start: 2023-11-01 | End: 2023-11-01

## 2023-11-01 RX ORDER — BUPIVACAINE HYDROCHLORIDE 5 MG/ML
INJECTION, SOLUTION PERINEURAL AS NEEDED
Status: DISCONTINUED | OUTPATIENT
Start: 2023-11-01 | End: 2023-11-01 | Stop reason: HOSPADM

## 2023-11-01 RX ORDER — LABETALOL HYDROCHLORIDE 5 MG/ML
5 INJECTION, SOLUTION INTRAVENOUS ONCE AS NEEDED
Status: DISCONTINUED | OUTPATIENT
Start: 2023-11-01 | End: 2023-11-01 | Stop reason: HOSPADM

## 2023-11-01 RX ORDER — DIPHENHYDRAMINE HYDROCHLORIDE 50 MG/ML
12.5 INJECTION INTRAMUSCULAR; INTRAVENOUS ONCE AS NEEDED
Status: DISCONTINUED | OUTPATIENT
Start: 2023-11-01 | End: 2023-11-01 | Stop reason: HOSPADM

## 2023-11-01 RX ORDER — OXYCODONE HYDROCHLORIDE 5 MG/1
5 TABLET ORAL EVERY 4 HOURS PRN
Status: DISCONTINUED | OUTPATIENT
Start: 2023-11-01 | End: 2023-11-01 | Stop reason: HOSPADM

## 2023-11-01 RX ORDER — LIDOCAINE HYDROCHLORIDE 20 MG/ML
INJECTION, SOLUTION EPIDURAL; INFILTRATION; INTRACAUDAL; PERINEURAL AS NEEDED
Status: DISCONTINUED | OUTPATIENT
Start: 2023-11-01 | End: 2023-11-01

## 2023-11-01 RX ORDER — CEFAZOLIN 1 G/1
INJECTION, POWDER, FOR SOLUTION INTRAVENOUS AS NEEDED
Status: DISCONTINUED | OUTPATIENT
Start: 2023-11-01 | End: 2023-11-01

## 2023-11-01 RX ORDER — CEFAZOLIN SODIUM 2 G/100ML
2 INJECTION, SOLUTION INTRAVENOUS ONCE
Status: DISCONTINUED | OUTPATIENT
Start: 2023-11-01 | End: 2023-11-01 | Stop reason: HOSPADM

## 2023-11-01 RX ORDER — SODIUM CHLORIDE 0.9 G/100ML
IRRIGANT IRRIGATION AS NEEDED
Status: DISCONTINUED | OUTPATIENT
Start: 2023-11-01 | End: 2023-11-01 | Stop reason: HOSPADM

## 2023-11-01 RX ORDER — MIDAZOLAM HYDROCHLORIDE 1 MG/ML
INJECTION INTRAMUSCULAR; INTRAVENOUS AS NEEDED
Status: DISCONTINUED | OUTPATIENT
Start: 2023-11-01 | End: 2023-11-01

## 2023-11-01 RX ORDER — FENTANYL CITRATE 50 UG/ML
INJECTION, SOLUTION INTRAMUSCULAR; INTRAVENOUS AS NEEDED
Status: DISCONTINUED | OUTPATIENT
Start: 2023-11-01 | End: 2023-11-01

## 2023-11-01 RX ORDER — SODIUM CHLORIDE, SODIUM LACTATE, POTASSIUM CHLORIDE, CALCIUM CHLORIDE 600; 310; 30; 20 MG/100ML; MG/100ML; MG/100ML; MG/100ML
100 INJECTION, SOLUTION INTRAVENOUS CONTINUOUS
Status: DISCONTINUED | OUTPATIENT
Start: 2023-11-01 | End: 2023-11-01 | Stop reason: HOSPADM

## 2023-11-01 RX ORDER — PROMETHAZINE HYDROCHLORIDE 25 MG/ML
6.25 INJECTION, SOLUTION INTRAMUSCULAR; INTRAVENOUS ONCE AS NEEDED
Status: DISCONTINUED | OUTPATIENT
Start: 2023-11-01 | End: 2023-11-01 | Stop reason: HOSPADM

## 2023-11-01 RX ORDER — DEXAMETHASONE SODIUM PHOSPHATE 100 MG/10ML
INJECTION INTRAMUSCULAR; INTRAVENOUS AS NEEDED
Status: DISCONTINUED | OUTPATIENT
Start: 2023-11-01 | End: 2023-11-01

## 2023-11-01 RX ORDER — ONDANSETRON HYDROCHLORIDE 2 MG/ML
INJECTION, SOLUTION INTRAVENOUS AS NEEDED
Status: DISCONTINUED | OUTPATIENT
Start: 2023-11-01 | End: 2023-11-01

## 2023-11-01 RX ADMIN — LIDOCAINE HYDROCHLORIDE 50 MG: 20 INJECTION, SOLUTION EPIDURAL; INFILTRATION; INTRACAUDAL; PERINEURAL at 07:53

## 2023-11-01 RX ADMIN — FENTANYL CITRATE 25 MCG: 50 INJECTION, SOLUTION INTRAMUSCULAR; INTRAVENOUS at 08:10

## 2023-11-01 RX ADMIN — DEXAMETHASONE SODIUM PHOSPHATE 4 MG: 4 INJECTION, SOLUTION INTRA-ARTICULAR; INTRALESIONAL; INTRAMUSCULAR; INTRAVENOUS; SOFT TISSUE at 07:53

## 2023-11-01 RX ADMIN — MIDAZOLAM HYDROCHLORIDE 2 MG: 1 INJECTION, SOLUTION INTRAMUSCULAR; INTRAVENOUS at 07:35

## 2023-11-01 RX ADMIN — GLYCOPYRROLATE 0.2 MG: 0.2 INJECTION INTRAMUSCULAR; INTRAVENOUS at 09:00

## 2023-11-01 RX ADMIN — FENTANYL CITRATE 25 MCG: 50 INJECTION, SOLUTION INTRAMUSCULAR; INTRAVENOUS at 09:23

## 2023-11-01 RX ADMIN — ONDANSETRON 4 MG: 2 INJECTION INTRAMUSCULAR; INTRAVENOUS at 07:53

## 2023-11-01 RX ADMIN — CEFAZOLIN 2 G: 1 INJECTION, POWDER, FOR SOLUTION INTRAMUSCULAR; INTRAVENOUS at 08:00

## 2023-11-01 RX ADMIN — SODIUM CHLORIDE, SODIUM LACTATE, POTASSIUM CHLORIDE, AND CALCIUM CHLORIDE: 600; 310; 30; 20 INJECTION, SOLUTION INTRAVENOUS at 07:32

## 2023-11-01 RX ADMIN — FENTANYL CITRATE 50 MCG: 50 INJECTION, SOLUTION INTRAMUSCULAR; INTRAVENOUS at 08:33

## 2023-11-01 RX ADMIN — PROPOFOL 200 MG: 10 INJECTION, EMULSION INTRAVENOUS at 07:53

## 2023-11-01 SDOH — HEALTH STABILITY: MENTAL HEALTH: CURRENT SMOKER: 0

## 2023-11-01 ASSESSMENT — COLUMBIA-SUICIDE SEVERITY RATING SCALE - C-SSRS
2. HAVE YOU ACTUALLY HAD ANY THOUGHTS OF KILLING YOURSELF?: NO
6. HAVE YOU EVER DONE ANYTHING, STARTED TO DO ANYTHING, OR PREPARED TO DO ANYTHING TO END YOUR LIFE?: NO
1. IN THE PAST MONTH, HAVE YOU WISHED YOU WERE DEAD OR WISHED YOU COULD GO TO SLEEP AND NOT WAKE UP?: NO

## 2023-11-01 ASSESSMENT — PAIN - FUNCTIONAL ASSESSMENT
PAIN_FUNCTIONAL_ASSESSMENT: 0-10
PAIN_FUNCTIONAL_ASSESSMENT: VAS (VISUAL ANALOG SCALE)
PAIN_FUNCTIONAL_ASSESSMENT: 0-10
PAIN_FUNCTIONAL_ASSESSMENT: 0-10
PAIN_FUNCTIONAL_ASSESSMENT: VAS (VISUAL ANALOG SCALE)
PAIN_FUNCTIONAL_ASSESSMENT: VAS (VISUAL ANALOG SCALE)
PAIN_FUNCTIONAL_ASSESSMENT: 0-10

## 2023-11-01 ASSESSMENT — PAIN SCALES - GENERAL
PAINLEVEL_OUTOF10: 0 - NO PAIN

## 2023-11-01 NOTE — PERIOPERATIVE NURSING NOTE
0979 Patient arrived to [place] after procedure with Anesthesia and procedure RN, procedure discussed, plan reviewed, VSS    0912 family called and updated    1000 pt tolerating juice and pudding    1015 phase 2

## 2023-11-01 NOTE — ANESTHESIA PREPROCEDURE EVALUATION
Patient: Lexx Mitchell    Procedure Information       Date/Time: 11/01/23 0730    Procedure: Right Breast Partial Mastectomy Re-Excision (Right)    Location: [facility] / Virtual [facility]    Surgeons: Erica Bass MD          72 yo F hx vertigo, former smoker, and HTN.  No issues with anesthesia.    Relevant Problems   Cardiovascular   (+) Hyperlipidemia with low HDL   (+) Hypertension, essential      Pulmonary   (+) Lung nodule, multiple       Clinical information reviewed:   Tobacco  Allergies  Meds   Med Hx  Surg Hx  OB Status  Fam Hx  Soc   Hx        NPO Detail:  NPO/Void Status  Carbonhydrate Drink Given Prior to Surgery? : N  Date of Last Liquid: 10/31/23  Time of Last Liquid: 2100  Date of Last Solid: 10/31/23  Time of Last Solid: 2100  Last Intake Type: Clear fluids  Time of Last Void: 0600         Physical Exam    Airway  Mallampati: III  TM distance: >3 FB  Neck ROM: full     Cardiovascular   Rate: normal     Dental - normal exam     Pulmonary    Abdominal            Anesthesia Plan    ASA 2     general     The patient is not a current smoker.    intravenous induction   Postoperative administration of opioids is intended.  Anesthetic plan and risks discussed with patient.    Nora Anderson APRN-CRNA, DNP

## 2023-11-01 NOTE — ANESTHESIA PROCEDURE NOTES
Airway  Date/Time: 11/1/2023 7:54 AM  Urgency: elective    Airway not difficult    Staffing  Performed: CRNA   Authorized by: Hollis Ward MD    Performed by: PAUL Sapp-SYDNEE, DAISY  Patient location during procedure: OR    Indications and Patient Condition  Indications for airway management: anesthesia  Spontaneous ventilation: present  Sedation level: deep  Preoxygenated: yes  Mask difficulty assessment: 0 - not attempted    Final Airway Details  Final airway type: supraglottic airway (I-Gel)      Successful airway: Size 4     Number of attempts at approach: 1  Ventilation between attempts: none  Number of other approaches attempted: 0    Additional Comments  Atraumatic  insertion of #4 I-Gel, spontaneous respirations maintained throughout.

## 2023-11-01 NOTE — PERIOPERATIVE NURSING NOTE
1015: Handoff from Deann MORRIS    1045:Family at bedside at this time    1050: Patient dressed with family assistance.     1100: Pt ambulated to bathroom with family and RN assistance    1110: Discharge instructions reviewed with patient and family, no further questions at this time. Peripheral IV removed with no complications.     1113: Patient to main lobby via transport with all belongings in stable condition. Phase 2 complete.

## 2023-11-01 NOTE — ANESTHESIA POSTPROCEDURE EVALUATION
Patient: Lexx Mitchell    Procedure Summary       Date: 11/01/23 Room / Location: [facility] OR 03 / [facility]    Anesthesia Start: 0732 Anesthesia Stop: 0933    Procedure: Right Breast Re-Excision Partial Mastectomy (Right: Breast) Diagnosis:       Ductal carcinoma in situ (DCIS) of right breast      (Ductal carcinoma in situ (DCIS) of right breast [D05.11])    Surgeons: Erica Bass MD Responsible Provider: Hollis Ward MD    Anesthesia Type: general ASA Status: 2            Anesthesia Type: general    Vitals Value Taken Time   /74 11/01/23 1015   Temp 36.2 °C (97.2 °F) 11/01/23 0926   Pulse 71 11/01/23 1015   Resp 15 11/01/23 1015   SpO2 96 % 11/01/23 1015       Anesthesia Post Evaluation    Patient location during evaluation: bedside  Patient participation: complete - patient participated  Level of consciousness: awake and alert  Pain management: adequate  Airway patency: patent  Cardiovascular status: acceptable and hemodynamically stable  Respiratory status: acceptable  Hydration status: acceptable        There were no known notable events for this encounter.

## 2023-11-03 ENCOUNTER — TELEPHONE (OUTPATIENT)
Dept: SURGICAL ONCOLOGY | Facility: CLINIC | Age: 73
End: 2023-11-03
Payer: MEDICARE

## 2023-11-03 ASSESSMENT — PAIN SCALES - GENERAL: PAINLEVEL_OUTOF10: 0 - NO PAIN

## 2023-11-06 DIAGNOSIS — D05.11 DUCTAL CARCINOMA IN SITU (DCIS) OF RIGHT BREAST: ICD-10-CM

## 2023-11-07 ENCOUNTER — APPOINTMENT (OUTPATIENT)
Dept: PRIMARY CARE | Facility: CLINIC | Age: 73
End: 2023-11-07
Payer: MEDICARE

## 2023-11-09 ENCOUNTER — TUMOR BOARD CONFERENCE (OUTPATIENT)
Dept: HEMATOLOGY/ONCOLOGY | Facility: HOSPITAL | Age: 73
End: 2023-11-09
Payer: MEDICARE

## 2023-11-13 ENCOUNTER — OFFICE VISIT (OUTPATIENT)
Dept: PRIMARY CARE | Facility: CLINIC | Age: 73
End: 2023-11-13
Payer: MEDICARE

## 2023-11-13 VITALS
DIASTOLIC BLOOD PRESSURE: 76 MMHG | WEIGHT: 148 LBS | HEART RATE: 98 BPM | OXYGEN SATURATION: 98 % | SYSTOLIC BLOOD PRESSURE: 124 MMHG | BODY MASS INDEX: 26.22 KG/M2 | HEIGHT: 63 IN

## 2023-11-13 DIAGNOSIS — M81.0 OSTEOPOROSIS, UNSPECIFIED OSTEOPOROSIS TYPE, UNSPECIFIED PATHOLOGICAL FRACTURE PRESENCE: Primary | ICD-10-CM

## 2023-11-13 DIAGNOSIS — E78.5 HYPERLIPIDEMIA WITH LOW HDL: ICD-10-CM

## 2023-11-13 DIAGNOSIS — E78.6 HYPERLIPIDEMIA WITH LOW HDL: ICD-10-CM

## 2023-11-13 DIAGNOSIS — Z23 IMMUNIZATION DUE: ICD-10-CM

## 2023-11-13 PROBLEM — T21.21XA SECOND DEGREE BURN OF CHEST WALL: Status: ACTIVE | Noted: 2023-04-27

## 2023-11-13 PROBLEM — T20.20XA SECOND DEGREE BURN OF FACE: Status: ACTIVE | Noted: 2023-04-27

## 2023-11-13 PROCEDURE — G0008 ADMIN INFLUENZA VIRUS VAC: HCPCS | Performed by: STUDENT IN AN ORGANIZED HEALTH CARE EDUCATION/TRAINING PROGRAM

## 2023-11-13 PROCEDURE — 1170F FXNL STATUS ASSESSED: CPT | Performed by: STUDENT IN AN ORGANIZED HEALTH CARE EDUCATION/TRAINING PROGRAM

## 2023-11-13 PROCEDURE — 1159F MED LIST DOCD IN RCRD: CPT | Performed by: STUDENT IN AN ORGANIZED HEALTH CARE EDUCATION/TRAINING PROGRAM

## 2023-11-13 PROCEDURE — 3078F DIAST BP <80 MM HG: CPT | Performed by: STUDENT IN AN ORGANIZED HEALTH CARE EDUCATION/TRAINING PROGRAM

## 2023-11-13 PROCEDURE — 90662 IIV NO PRSV INCREASED AG IM: CPT | Performed by: STUDENT IN AN ORGANIZED HEALTH CARE EDUCATION/TRAINING PROGRAM

## 2023-11-13 PROCEDURE — 1126F AMNT PAIN NOTED NONE PRSNT: CPT | Performed by: STUDENT IN AN ORGANIZED HEALTH CARE EDUCATION/TRAINING PROGRAM

## 2023-11-13 PROCEDURE — G0439 PPPS, SUBSEQ VISIT: HCPCS | Performed by: STUDENT IN AN ORGANIZED HEALTH CARE EDUCATION/TRAINING PROGRAM

## 2023-11-13 PROCEDURE — 1160F RVW MEDS BY RX/DR IN RCRD: CPT | Performed by: STUDENT IN AN ORGANIZED HEALTH CARE EDUCATION/TRAINING PROGRAM

## 2023-11-13 PROCEDURE — 3074F SYST BP LT 130 MM HG: CPT | Performed by: STUDENT IN AN ORGANIZED HEALTH CARE EDUCATION/TRAINING PROGRAM

## 2023-11-13 ASSESSMENT — ACTIVITIES OF DAILY LIVING (ADL)
DRESSING: INDEPENDENT
GROCERY_SHOPPING: INDEPENDENT
TAKING_MEDICATION: INDEPENDENT
BATHING: INDEPENDENT
DOING_HOUSEWORK: INDEPENDENT
MANAGING_FINANCES: INDEPENDENT

## 2023-11-13 ASSESSMENT — PATIENT HEALTH QUESTIONNAIRE - PHQ9
2. FEELING DOWN, DEPRESSED OR HOPELESS: NOT AT ALL
1. LITTLE INTEREST OR PLEASURE IN DOING THINGS: NOT AT ALL
SUM OF ALL RESPONSES TO PHQ9 QUESTIONS 1 AND 2: 0

## 2023-11-13 ASSESSMENT — ENCOUNTER SYMPTOMS
OCCASIONAL FEELINGS OF UNSTEADINESS: 0
DEPRESSION: 0
LOSS OF SENSATION IN FEET: 0

## 2023-11-13 NOTE — PATIENT INSTRUCTIONS
Thank you for coming today!    You received your flu shot today.    For your ear wax in your right ear, you can use debrox or mineral oil. Place a few drops in your R ear before bed and lay a towel on your pillow. You can do these for several days until the wax falls out.     Please get a DEXA bone density scan and a CT cardiac score. Then, we will decide on osteoporosis medication and cholesterol medications together. You can get this done on the lower level in [address]. The phone number is [phone].     You can get the colonoscopy sometime next year. Please schedule by calling [phone].       For your bones:   You should consume a total of 1200mg of calcium daily through diet and supplements. Take calcium supplements with meals.  A serving of dairy is about 300mg of calcium, so 2 servings of dairy and one 600mg tablet will get you to goal!     Calcium carbonate can cause constipation so watch for this. Calcium citrate causes less constipation. You can check if calcium is carbonate or citrate by looking at labels at the pharmacy or asking a pharmacist.      You should also take 2000 I.U. of vitamin D every day.      1 cup of milk or yogurt contains 300 mg calcium. 1 oz cheese or cottage cheese contains 200 mg. Calcium-fortified orange juice contains 300 mg per 8 oz.      Try to focus on foods to get the 1200 mg recommended calcium and if needed add that calcium supplement.

## 2023-11-13 NOTE — PROGRESS NOTES
"Subjective   Patient ID: Lexx Mitchell is a 73 y.o. female who presents for Medicare Annual Wellness.       HPI  Interim:   Breast surgery on 9/25 and 11/1 for R DCIS   -Initial specimen did not show clear margins, so went in for re-excision   -Path still pending from 11/1     Not in pain from surgeries     Signed up for line dancing and aerobics classes    Chronic issues:  #HTN  -On lisinopril 2.5 mg daily and amlodipine 5mg daily   -Calcium score 0 on 5/3/2021  -Has elevated ASCVD risk of 9.2- did not tolerate statin  BP good today  At home it is in 140s systolic     Health maintenance:  -Due for colonoscopy   -Lung cancer screening in Dec 2022 showed scattered pulmonary nodules measuring up to 5mm- recommended follow up in 12 months  -Shingles, pneumonia, Tdap (2023) vaccines up to date, due for flu shot today   -DEXA 4/24/21 showed osteoporosis of spine, osteopenia elsewhere      Social history:  -Lives alone  -Sister, aunt, cousin in [place]  -Sister in [place]   -Quit smoking in February 2023-- currently smoking again (5 cigarettes per day)  -Occasional alcohol once a month  -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  -Gardening, yoga, walking     Family history:  -2 sisters with DM         Objective   Visit Vitals  /76   Pulse 98   Ht 1.6 m (5' 3\")   Wt 67.1 kg (148 lb)   SpO2 98%   BMI 26.22 kg/m²   OB Status Postmenopausal   Smoking Status Some Days   BSA 1.73 m²      Physical Exam  General: Well appearing, conversational, in no acute distress  HEENT: EOMI, PERRL, MMM, R TM not visualized due to cerumen impaction, L TM clear with good light reflex   CV: RRR, no murmurs  Resp: Lungs CTAB, normal work of breathing  GI: Soft, nondistended, nontender, BS+   Ext: No lower ext swelling  Skin: Warm, dry, incision lateral to right breast healing well  Neuro: Awake, alert, oriented x3, moving all 4 extremities, nonfocal, normal gait, ambulates without assistance  Psych: Appropriate mood and affect  "     Assessment/Plan   Lexx Mitchell is a 73 y.o. female currently developing plan for therapy for DCIS of R breast and HTN who presents for Medicare Annual Wellness. Doing well overall. Has R sided cerumen impaction. Unable to remove today. Recommended debrox or mineral oil and possible ent referral if not successful.    Chronic issues:  #HTN  -On lisinopril 2.5 mg daily and amlodipine 5mg daily   -Calcium score 0 on 5/3/2021  -Has elevated ASCVD risk of 9.2- did not tolerate statin  BP good today    #DCIS of R breast  -Awaiting path results from 11/1 from re-excision  -Has appt on 11/16    Health maintenance:  -Due for colonoscopy - ordered   -Lung cancer screening in Dec 2022 showed scattered pulmonary nodules measuring up to 5mm- recommended follow up in 12 months  -Shingles, pneumonia, Tdap (2023) vaccines up to date, received flu shot today  -DEXA 4/24/21 showed osteoporosis of spine, osteopenia elsewhere-- will repeat as patient will likely be introduced to new risk factors with treatment plan for breast cancer  -Repeat CT calcium score, did  not tolerate statin well      Problem List Items Addressed This Visit       Osteoporosis - Primary    Relevant Orders    XR DEXA bone density    Hyperlipidemia with low HDL    Relevant Orders    CT cardiac scoring wo IV contrast     Other Visit Diagnoses       Immunization due        Relevant Orders    Flu vaccine, quadrivalent, high-dose, preservative free, age 65y+ (FLUZONE) (Completed)            Vanessa Quinn MD MPH

## 2023-11-15 LAB
LABORATORY COMMENT REPORT: NORMAL
PATH REPORT.FINAL DX SPEC: NORMAL
PATH REPORT.GROSS SPEC: NORMAL
PATH REPORT.RELEVANT HX SPEC: NORMAL
PATH REPORT.TOTAL CANCER: NORMAL

## 2023-11-16 ENCOUNTER — TUMOR BOARD CONFERENCE (OUTPATIENT)
Dept: HEMATOLOGY/ONCOLOGY | Facility: HOSPITAL | Age: 73
End: 2023-11-16
Payer: MEDICARE

## 2023-11-16 ENCOUNTER — OFFICE VISIT (OUTPATIENT)
Dept: SURGICAL ONCOLOGY | Facility: CLINIC | Age: 73
End: 2023-11-16
Payer: MEDICARE

## 2023-11-16 VITALS
HEART RATE: 82 BPM | BODY MASS INDEX: 26.22 KG/M2 | DIASTOLIC BLOOD PRESSURE: 77 MMHG | SYSTOLIC BLOOD PRESSURE: 138 MMHG | WEIGHT: 148 LBS | TEMPERATURE: 98.2 F

## 2023-11-16 DIAGNOSIS — Z90.11 STATUS POST PARTIAL MASTECTOMY OF RIGHT BREAST: ICD-10-CM

## 2023-11-16 DIAGNOSIS — D05.11 DUCTAL CARCINOMA IN SITU (DCIS) OF RIGHT BREAST: Primary | ICD-10-CM

## 2023-11-16 DIAGNOSIS — I10 HYPERTENSION, ESSENTIAL: ICD-10-CM

## 2023-11-16 DIAGNOSIS — D05.11 DUCTAL CARCINOMA IN SITU (DCIS) OF RIGHT BREAST: ICD-10-CM

## 2023-11-16 PROCEDURE — 1160F RVW MEDS BY RX/DR IN RCRD: CPT | Performed by: SURGERY

## 2023-11-16 PROCEDURE — 1159F MED LIST DOCD IN RCRD: CPT | Performed by: SURGERY

## 2023-11-16 PROCEDURE — 3075F SYST BP GE 130 - 139MM HG: CPT | Performed by: SURGERY

## 2023-11-16 PROCEDURE — 99024 POSTOP FOLLOW-UP VISIT: CPT | Performed by: SURGERY

## 2023-11-16 PROCEDURE — 1126F AMNT PAIN NOTED NONE PRSNT: CPT | Performed by: SURGERY

## 2023-11-16 PROCEDURE — 3078F DIAST BP <80 MM HG: CPT | Performed by: SURGERY

## 2023-11-16 RX ORDER — BISMUTH SUBSALICYLATE 262 MG
1 TABLET,CHEWABLE ORAL DAILY
COMMUNITY

## 2023-11-16 ASSESSMENT — ENCOUNTER SYMPTOMS
PSYCHIATRIC NEGATIVE: 1
ENDOCRINE NEGATIVE: 1
CONSTITUTIONAL NEGATIVE: 1
ALLERGIC/IMMUNOLOGIC NEGATIVE: 1
NEUROLOGICAL NEGATIVE: 1
CARDIOVASCULAR NEGATIVE: 1
MUSCULOSKELETAL NEGATIVE: 1
HEMATOLOGIC/LYMPHATIC NEGATIVE: 1
RESPIRATORY NEGATIVE: 1
GASTROINTESTINAL NEGATIVE: 1
EYES NEGATIVE: 1

## 2023-11-16 ASSESSMENT — PAIN SCALES - GENERAL: PAINLEVEL: 0-NO PAIN

## 2023-11-16 NOTE — PROGRESS NOTES
Subjective   Patient ID: Lexx Mitchell is a 73 y.o. female presenting for post-op visit.    HPI  Lexx Mitchell is a pleasant 73 year old AA female s/p re-excision partial mastectomy 23. Final pathology showed additional DCIS, margins widely negative, pTis Stage 0.     She is s/p right breast Magseed localized partial mastectomy on 23 for right breast atypical papillary lesion bordering on DCIS. Final pathology showed DCIS, 2.8 cm, intermediate grade, 4/6 margins close and/or positive,  ER+ 100%, pTis, stage 0.     BREAST IMAGIN2023 Screening mammogram indicates BI-RADS Category 0, bilateral breast masses.      2023 Right breast diagnostic mammogram and bilateral breast ultrasound indicates BI-RADS Category 4. Left breast 11:00 8 cm from the nipple is an oval circumscribed hypoechoic mass measuring 1.3 x 0.6 x 0.9 cm, probably benign; recommend 6 months follow up. Right breast 11:00 5 cm from the nipple is an oval circumscribed hypoechoic mass measuring 0.6 x 0.4 x 0.6 cm, probably benign; recommend 6 month follow up. Right breast 9:00 11 cm from the nipple is an irregular mass with angular and indistinct margins measuring 1.3 x 0.6 x 1.3 cm, biopsy recommended.      On 2023, left breast, ultrasound guided core needle biopsy 4:00, 8cm FN showed fibroadenoma with usual ductal hyperplasia. Right breast, ultrasound guided core needle biopsy showed atypical papillary lesion bordering on low grade DCIS.      She presents for postoperative visit. She is recovering well from surgery. Has not yet returned to normal activities.     FEMALE HISTORY: menarche age 14, , first birth age 29, she did not breastfeed, OCPs x 5 years, menopause age 44, no HRT, scattered fibroglandular tissue. Remote history of bilateral breast biopsies, benign per patient report.      FAMILY CANCER HISTORY:   Sister: Breast cancer age 38     Past Medical History:   Diagnosis Date    Benign paroxysmal vertigo,  unspecified ear 03/08/2017    Benign paroxysmal positional vertigo, unspecified laterality    Dizziness and giddiness 06/01/2018    Disequilibrium    HTN (hypertension)     Local infection of the skin and subcutaneous tissue, unspecified 04/26/2019    Infection of skin, local    Major depressive disorder, single episode, moderate (CMS/HCC) 05/13/2019    Moderate single current episode of major depressive disorder    Other nonspecific abnormal finding of lung field 01/12/2021    Abnormal CT lung screening    Personal history of other endocrine, nutritional and metabolic disease 03/09/2021    History of hyperlipidemia    Personal history of other specified conditions 03/14/2016    History of vertigo     Current Outpatient Medications on File Prior to Visit   Medication Sig Dispense Refill    amLODIPine (Norvasc) 5 mg tablet Take 1 tablet (5 mg) by mouth once daily. 90 tablet 1    ascorbic acid (VITAMIN C ORAL)       cholecalciferol (Vitamin D-3) 50 mcg (2,000 unit) capsule Vitamin D3 50 MCG (2000 UT) Oral Capsule   Refills: 0       Active      lisinopril 2.5 mg tablet TAKE 1 TABLET BY MOUTH ONCE DAILY. 90 tablet 0    multivitamin tablet Take 1 tablet by mouth once daily.      [DISCONTINUED] triamcinolone (Nasacort) 55 mcg nasal inhaler Administer 1 spray into each nostril once daily. 10.8 mL 3     No current facility-administered medications on file prior to visit.          Review of Systems   Constitutional: Negative.    HENT: Negative.     Eyes: Negative.    Respiratory: Negative.     Cardiovascular: Negative.    Gastrointestinal: Negative.    Endocrine: Negative.    Genitourinary: Negative.    Musculoskeletal: Negative.    Skin: Negative.    Allergic/Immunologic: Negative.    Neurological: Negative.    Hematological: Negative.    Psychiatric/Behavioral: Negative.     All other systems reviewed and are negative.      Objective   Physical Exam  General: Otherwise healthy appearing. No acute distress.      HEENT:  Conjunctiva well-colored, sclera non-icteric. Neck supple, trachea midline. No lymphadenopathy or thyromegaly.      Cardiovascular: Regular rate and rhythm.      Respiratory: Clear to auscultation bilaterally.      Breast: Exam performed in the sitting and supine positions. Right breast: 9:00, 13 cm FN, well-healing incision.      Abdomen: Soft, non-tender, non-distended.      Extremities: Atraumatic, no edema.      Neurologic: Motor and sensory grossly intact. Alert and oriented.       Lymphatic: No axillary, supraclavicular, or infraclavicular lymphadenopathy bilaterally.     Assessment/Plan   You are recovering very well from surgery and your incisions are healing well.     Instructions:   1. Continue the arm exercises. Okay to gradually increase activity with your upper body.   2. Please continue to use Tylenol and/or ice for discomfort.     We discussed your pathology report. This showed a small amount of additional DCIS but widely clear margins.  I explained the recommendations for radiation therapy and antiestrogen medication.     All questions were answered in detail, and we are in agreement with the following plan:     1. Referral to radiation oncology.   2. Please return in January 2024 for endocrine therapy discussion with one of our surgical NPs.  At this time you will be due for left breast mammogram and ultrasound, and right breast ultrasound for short-term follow up of probably benign findings.   3. Referral to genetics.    If you need assistance with scheduling, please call [phone] option #2. If you have any questions, concerns, or changes in your breast self-exam prior to our next visit, please call my office at the number below. Our breast care team looks forward to seeing you again soon.     Erica Bass MD   Breast Surgical Oncology   Office: [phone] (option #2)       **DISCLAIMER** Speech recognition software was used to create portions of this document. While an attempt at  proofreading has been made, minor errors in transcription may be present.    Diagnoses and all orders for this visit:  Ductal carcinoma in situ (DCIS) of right breast  -     Referral to Hematology and Oncology; Future  -     Referral to Radiation Oncology; Future  Status post partial mastectomy of right breast  Hypertension, essential

## 2023-11-21 ENCOUNTER — ONCOLOGY MEDICATION OUTREACH (OUTPATIENT)
Dept: HEMATOLOGY/ONCOLOGY | Facility: CLINIC | Age: 73
End: 2023-11-21
Payer: MEDICARE

## 2023-11-21 ENCOUNTER — OFFICE VISIT (OUTPATIENT)
Dept: HEMATOLOGY/ONCOLOGY | Facility: CLINIC | Age: 73
End: 2023-11-21
Payer: MEDICARE

## 2023-11-21 VITALS
HEART RATE: 80 BPM | DIASTOLIC BLOOD PRESSURE: 81 MMHG | SYSTOLIC BLOOD PRESSURE: 144 MMHG | BODY MASS INDEX: 26.81 KG/M2 | WEIGHT: 151.35 LBS | TEMPERATURE: 97.9 F

## 2023-11-21 DIAGNOSIS — D05.11 DUCTAL CARCINOMA IN SITU (DCIS) OF RIGHT BREAST: ICD-10-CM

## 2023-11-21 PROCEDURE — 99215 OFFICE O/P EST HI 40 MIN: CPT | Performed by: STUDENT IN AN ORGANIZED HEALTH CARE EDUCATION/TRAINING PROGRAM

## 2023-11-21 PROCEDURE — 3077F SYST BP >= 140 MM HG: CPT | Performed by: STUDENT IN AN ORGANIZED HEALTH CARE EDUCATION/TRAINING PROGRAM

## 2023-11-21 PROCEDURE — 99205 OFFICE O/P NEW HI 60 MIN: CPT | Performed by: STUDENT IN AN ORGANIZED HEALTH CARE EDUCATION/TRAINING PROGRAM

## 2023-11-21 PROCEDURE — 1160F RVW MEDS BY RX/DR IN RCRD: CPT | Performed by: STUDENT IN AN ORGANIZED HEALTH CARE EDUCATION/TRAINING PROGRAM

## 2023-11-21 PROCEDURE — 3079F DIAST BP 80-89 MM HG: CPT | Performed by: STUDENT IN AN ORGANIZED HEALTH CARE EDUCATION/TRAINING PROGRAM

## 2023-11-21 PROCEDURE — 1159F MED LIST DOCD IN RCRD: CPT | Performed by: STUDENT IN AN ORGANIZED HEALTH CARE EDUCATION/TRAINING PROGRAM

## 2023-11-21 PROCEDURE — 1126F AMNT PAIN NOTED NONE PRSNT: CPT | Performed by: STUDENT IN AN ORGANIZED HEALTH CARE EDUCATION/TRAINING PROGRAM

## 2023-11-21 ASSESSMENT — PAIN SCALES - GENERAL: PAINLEVEL: 0-NO PAIN

## 2023-11-21 NOTE — PROGRESS NOTES
Breast Medical Oncology Clinic  Location: [facility]    Visit Type: New Patient Visit    ONC History:    23: Bilateral screening mammogram: Lobulated mass in the RUQ of the breast. There is a stable mass in the SAIDA quadrant of the breast that is stable.   23: Diagnostic Mammogram/US: Indeterminate mass in the right breast at 9 o'clock 11 cm from the nipple. Probably benign mass in the left breast at 11:30 8 cm from the nipple. Short-term ultrasound and mammogram follow-up in 6 months recommended to ensure stability. Probably benign mass in the right breast at 11 o'clock 5 cm from the nipple. Short-term ultrasound follow-up in 6 months is recommended to ensure stability.  23: L breast BX 4:00: Fibroadenoma with usual ductal hyperplasia. R breast Bx 9:00: Atypical papillary lesion, bordering on low grade DCIS.   23: R breast excision: DCIS, intermediate grade. Positive posterior and superior margin. ER positive (100%)  23: R breast re-excision: negative new margins.       Subjective: History of Present Illness    Patient presents today for NPV for management of recently diagnosed breast cancer. S  Oncologic history reviewed above.     She was in her usual state of health at the time of diagnosis.     She has recovered well from surgery.    Denies weight loss, changes in the breast and/or chest wall, new aches or pains, changes in appetite or energy level. No current concerns at this time.     GYN History/Pertinent Family history:    Age of first menses: 14 years old  Age of last menses: late 40s  , FLB 30  post-menopausal  She did not breastfeed  Uterus/Ovaries: Intact  OCP: >10 years    Breast Cancer:  Sister, 30s  Ovarian Cancer: None  Pancreatic Cancer: None  Other: None    Social History  Lexx Mitchell  reports that she has been smoking cigarettes. She has a 5.00 pack-year smoking history. She has never been exposed to tobacco smoke. She has never used smokeless tobacco.  She   reports no history of alcohol use.  She  reports no history of drug use.    ROS:     Review of Systems   All other systems reviewed and are negative.       Physical Examination:    /81 (BP Location: Left arm, Patient Position: Sitting, BP Cuff Size: Adult)   Pulse 80   Temp 36.6 °C (97.9 °F) (Skin)   Wt 68.6 kg (151 lb 5.5 oz)   BMI 26.81 kg/m²     Physical Exam    Breast Examination:    Well healed R breast incision. No masses, skin or nipple changes.     ECOG Performance Status:     [x] 0 Fully active, able to carry on all pre-disease performance without restriction  [] 1 Restricted in physically strenuous activity but ambulatory and able to carry out work of a light or sedentary nature, e.g., light house work, office work  [] 2 Ambulatory and capable of all selfcare but unable to carry out any work activities; up and about more than 50% of waking hours  [] 3 Capable of only limited selfcare; confined to bed or chair more than 50% of waking hours  [] 4 Completely disabled; cannot carry on any selfcare; totally confined to bed or chair  [] 5 Dead     Results:    Labs:  N/A    Imaging:  Reviewed above in Onc History    Pathology:  Reviewed above in Onc History    Assessment:     Pathologic prognostic stage 0 (pTis pNX cM0) ductal carcinoma in-situ of the right breast; Dx in 7/2023; Grade 2; ER positive (100%); S/p R breast PM followed by re-excision for positive margins.     Lexx Mitchell is a very pleasant 73 y.o. postmenopausal female with newly diagnosed breast cancer with history of osteoporosis, HTN and HLD. We reviewed the events that led to her diagnosis and subsequent procedures that have taken place. We discussed the features of her cancer that determine the approach to treatment including the size, grade, presence/absence of lymphovascular invasion, lymph node status and hormone receptor status (including Her2-vidhi). Given these findings, we had the following discussion:      Plan:    Surgical  Plan: R breast PM followed by re-excision for positive margins.   Additional biopsy: No further biopsy indicated  Radiation Plan: Referral in Place  Additional staging scans/DEXA/echo: Staging scans not indicated based on current stage, patient history and physical examination. DEXA 4/2021 reviewed  Additional Path info (i.e Ki67, PDL1): Not indicated  Gene assays:  Not indicated    Systemic treatment plan: I had a long discussion today with the patient regarding her recent diagnosis of DCIS, evaluation and management she has received thus far and options for future management, namely systemic therapy options. We reviewed her pathology results together in detail. I explained the mechanism of action of endocrine therapy. I discussed the role of endocrine therapy in ER-positive DCIS.  Specifically, I discussed data from the NSABP B-24 trial demonstrating benefit from tamoxifen following surgery and radiation therapy with an absolute reduction in ipsilateral in breast recurrence risk of 3.4% and absolute reduction in contralateral breast cancer of 3.2%, although no differences in overall survival were noted.   Given her post-menopausal status, I also reviewed data from the CASSANDRA-II study and NSABP B-35 study demonstrating the benefit of anastrozole as adjuvant treatment for postmenopausal women with hormone-receptor positive DCIS. She would like to think about both anastrozole and tamoxifen. Information given for her review. She will contact us after meeting with radiation oncology and has made a decision.      Intent: Curative   Clinical trial: Not eligible for our current trials   Endocrine therapy:  Discussed above   HER2 treatment: not indicated   Targeted agents: not indicated   Chemotherapy: Not indicated   BMA: Not indicated    Access: Not indicated  Supportive meds: No new supportive medications prescribed  Genetic testing: Not indicated  Fertility preservation: Not indicated  Other active problems/orders:      Osteoporosis: Encouraged patient to discuss treatment with her PCP. This is taken into consideration regarding endocrine therapy. We discussed general recommendations for bone loss prevention which include 1063-3028 mg of calcium intake per day for adults >50yrs, and no history of renal calculi; 800-1000 IU of vitamin D3 per day for adults >50yrs, and no history of renal calculi. Weight bearing exercise. Discontinue smoking. Avoid excessive use of caffeine, soft drinks, and alcoholic beverages. In addition, balance training and fall prevention programs can help reduce the risk of fractures.     Surveillance plan: Continue yearly mammogram; Needs 6 month follow breast imaging, due in January. She is seeing surgery team at that time.     Follow-up: 3 months; Patient to call when she has made decision regarding endocrine therapy.     Patient expressed understanding of the plan outlined above. She had ample time to ask questions. She understands she can contact us should she have additional questions or issues arise in the interim.

## 2023-11-21 NOTE — PROGRESS NOTES
Reviewed drug, dose, frequency, administration, treatment cycle, duration of therapy, and missed doses. Counseled on potential side effects including but not limited to hot flashes and muscle/joint pain. Discussed techniques to mitigate severity of side effects.  Provided medication/regimen handout. Patient had questions about anastrazole. All questions answered and contact information was given to patient.

## 2023-11-21 NOTE — PATIENT INSTRUCTIONS
Proceed with radiation oncology appointment  Review information on Anastrozole  Please call us when you have made a decision regarding anastrozole  Anastrozole will start AFTER radiation if radiation is recommended to you  Follow-up with Dr. Bedolla in 3 month

## 2023-12-06 ENCOUNTER — HOSPITAL ENCOUNTER (OUTPATIENT)
Dept: RADIATION ONCOLOGY | Facility: CLINIC | Age: 73
Setting detail: RADIATION/ONCOLOGY SERIES
Discharge: HOME | End: 2023-12-06
Payer: MEDICARE

## 2023-12-06 VITALS
HEART RATE: 88 BPM | WEIGHT: 149.25 LBS | OXYGEN SATURATION: 97 % | RESPIRATION RATE: 18 BRPM | SYSTOLIC BLOOD PRESSURE: 153 MMHG | BODY MASS INDEX: 26.45 KG/M2 | HEIGHT: 63 IN | DIASTOLIC BLOOD PRESSURE: 76 MMHG | TEMPERATURE: 97.1 F

## 2023-12-06 DIAGNOSIS — D05.11 DUCTAL CARCINOMA IN SITU (DCIS) OF RIGHT BREAST: ICD-10-CM

## 2023-12-06 PROCEDURE — 99215 OFFICE O/P EST HI 40 MIN: CPT | Performed by: RADIOLOGY

## 2023-12-06 PROCEDURE — 99205 OFFICE O/P NEW HI 60 MIN: CPT | Performed by: RADIOLOGY

## 2023-12-06 ASSESSMENT — LIFESTYLE VARIABLES
HOW OFTEN DO YOU HAVE A DRINK CONTAINING ALCOHOL: NEVER
SKIP TO QUESTIONS 9-10: 1
HOW MANY STANDARD DRINKS CONTAINING ALCOHOL DO YOU HAVE ON A TYPICAL DAY: PATIENT DOES NOT DRINK
AUDIT-C TOTAL SCORE: 0
HOW OFTEN DO YOU HAVE SIX OR MORE DRINKS ON ONE OCCASION: NEVER

## 2023-12-06 ASSESSMENT — ENCOUNTER SYMPTOMS
GASTROINTESTINAL NEGATIVE: 1
MUSCULOSKELETAL NEGATIVE: 1
ENDOCRINE NEGATIVE: 1
HEMATOLOGIC/LYMPHATIC NEGATIVE: 1
CARDIOVASCULAR NEGATIVE: 1
NEUROLOGICAL NEGATIVE: 1
CONSTITUTIONAL NEGATIVE: 1
RESPIRATORY NEGATIVE: 1
PSYCHIATRIC NEGATIVE: 1
EYES NEGATIVE: 1

## 2023-12-06 ASSESSMENT — PAIN SCALES - GENERAL: PAINLEVEL: 0-NO PAIN

## 2023-12-06 NOTE — PROGRESS NOTES
[facility] Oncology Outpatient Consult    Patient Name:  Lexx Mitchell  MRN:  [id number]  :  1950    Referring Provider: Erica Bass MD  Primary Care Provider: Vanessa Quinn MD MPH  Care Team: Patient Care Team:  Vanessa Quinn MD MPH as PCP - General (Internal Medicine/Pediatrics)  Gabbi Tran MD as PCP - [employer] PCP  Deshawn Bedolla MD as Consulting Physician (Hematology and Oncology)    Date of Service: 2023     SUBJECTIVE  History of Present Illness:  Lexx Mitchell is a 73 y.o. female who was referred by Erica Bass MD, for a consultation to the [facility] Department of Radiation Oncology.  She is presenting for evaluation and management of her newly diagnosed ductal carcinoma in situ of the right breast status post right partial mastectomy.     Ms. Mitchell underwent a routine screening mammogram on 2023 which revealed a lobulated mass in the upper outer quadrant of the right breast as well as an oval mass in the upper outer quadrant of the left breast and a circumscribed mass in the upper inner quadrant of the left breast.  Bilateral diagnostic mammograms performed 2023 revealed a mass in the upper outer quadrant of the right breast.  Ultrasound of the right breast directed at the 11 o'clock position, 5 cm from the nipple revealed a 0.6 x 0.4 x 0.6 cm hypoechoic mass thought likely to be benign.  At the 9 o'clock position, 11 cm from the nipple was a 1.3 x 0.6 x 1.6 cm irregular mass.  In the left breast at the 11:30 position, 8 cm from the nipple was a 1.3 x 0.6 x 0.9 cm mass thought likely to be benign.  On 2023 she underwent a left breast core biopsy directed at the 4 o'clock position, 8 cm from the nipple which revealed a fibroadenoma.  Right breast core biopsy directed at the 9 o'clock position, 11 cm from the nipple revealed an atypical papillary lesion.  On 2023 she underwent a right partial mastectomy.   Pathology revealed a 2.8 cm ductal carcinoma in situ of the cribriform, micropapillary and solid subtypes, nuclear grade 2.  The resection margins were positive.  Estrogen receptors stained positive at 100%.  She underwent a reexcision on 11/1/2023.  There was focal residual ductal carcinoma in situ with negative margins of resection.  I was asked to see Ms. Mitchell by Dr. Erica Bass for a discussion regarding the role of radiation in the management of this newly diagnosed ductal carcinoma in situ.    Prior Radiotherapy:  No  Radiation Treatments       No radiation treatments to show. (Treatments may have been administered in another system.)          Current Systemic Treatment:  No     Presence of Pacemaker or ICD:  No    Past Medical History:    Past Medical History:   Diagnosis Date    Benign paroxysmal vertigo, unspecified ear 03/08/2017    Benign paroxysmal positional vertigo, unspecified laterality    Dizziness and giddiness 06/01/2018    Disequilibrium    HTN (hypertension)     Local infection of the skin and subcutaneous tissue, unspecified 04/26/2019    Infection of skin, local    Major depressive disorder, single episode, moderate (CMS/HCC) 05/13/2019    Moderate single current episode of major depressive disorder    Other nonspecific abnormal finding of lung field 01/12/2021    Abnormal CT lung screening    Personal history of other endocrine, nutritional and metabolic disease 03/09/2021    History of hyperlipidemia    Personal history of other specified conditions 03/14/2016    History of vertigo        Past Surgical History:    Past Surgical History:   Procedure Laterality Date    COLONOSCOPY  01/27/2015    Complete Colonoscopy    OTHER SURGICAL HISTORY  01/27/2015    Colposcopy Cervix With Biopsy(S)        Family History:  Cancer-related family history includes Breast cancer in her sister; Lung cancer in her father.    Social History:    Social History     Tobacco Use    Smoking status: Former      Packs/day: 0.25     Years: 20.00     Additional pack years: 0.00     Total pack years: 5.00     Types: Cigarettes     Quit date: 2023     Years since quittin.7     Passive exposure: Never    Smokeless tobacco: Never   Substance Use Topics    Alcohol use: Never    Drug use: Never     Gynecologic History: Menarche age 14.  G1, P1.  She delivered her first child at the age of 29.  She went through menopause at the age of 44.  She took oral contraceptive pills for 5 years.  She denies any history of hormone replacement therapy.    Allergies:  No Known Allergies     Medications:    Current Outpatient Medications:     amLODIPine (Norvasc) 5 mg tablet, Take 1 tablet (5 mg) by mouth once daily., Disp: 90 tablet, Rfl: 1    ascorbic acid (VITAMIN C ORAL), , Disp: , Rfl:     cholecalciferol (Vitamin D-3) 50 mcg (2,000 unit) capsule, Vitamin D3 50 MCG (2000 UT) Oral Capsule  Refills: 0     Active, Disp: , Rfl:     lisinopril 2.5 mg tablet, TAKE 1 TABLET BY MOUTH ONCE DAILY., Disp: 90 tablet, Rfl: 0    multivitamin tablet, Take 1 tablet by mouth once daily., Disp: , Rfl:       Review of Systems:  Review of Systems   Constitutional: Negative.    HENT:  Negative.     Eyes: Negative.    Respiratory: Negative.     Cardiovascular: Negative.    Gastrointestinal: Negative.    Endocrine: Negative.    Genitourinary: Negative.     Musculoskeletal: Negative.    Skin: Negative.    Neurological: Negative.    Hematological: Negative.    Psychiatric/Behavioral: Negative.       The patient's current pain level was assessed.  They report currently having a pain of 0 out of 10.  They feel their pain is under control without the use of pain medications.    Performance Status:  The Karnofsky performance scale today is 100, Fully active, able to carry on all pre-disease performed without restriction (ECOG equivalent 0).        OBJECTIVE  Physical Exam:  /76 (BP Location: Left arm, Patient Position: Sitting, BP Cuff Size: Adult)    "Pulse 88   Temp 36.2 °C (97.1 °F) (Temporal)   Resp 18   Ht 1.6 m (5' 2.99\")   Wt 67.7 kg (149 lb 4 oz)   SpO2 97%   BMI 26.45 kg/m²    Physical Exam  Constitutional:       Appearance: Normal appearance.   HENT:      Head: Normocephalic and atraumatic.   Eyes:      Conjunctiva/sclera: Conjunctivae normal.   Cardiovascular:      Heart sounds: Normal heart sounds.   Pulmonary:      Breath sounds: Normal breath sounds.   Chest:      Comments: Examination of the right breast reveals a well-healing incision laterally.  There are no suspicious nodules, nipple retraction or discharge bilaterally.  Abdominal:      Palpations: Abdomen is soft.   Musculoskeletal:      Right lower leg: No edema.      Left lower leg: No edema.   Lymphadenopathy:      Cervical: No cervical adenopathy.      Upper Body:      Right upper body: No supraclavicular or axillary adenopathy.      Left upper body: No supraclavicular or axillary adenopathy.   Neurological:      Mental Status: She is alert.          Laboratory Review:  There are no laboratory contraindications to radiation therapy.      Pathology Review:  The pertinent pathology results were reviewed and discussed with the patient.      Imaging:  The pertinent imaging results were reviewed and discussed with the patient.         ASSESSMENT:  Lexx Mitchell is a 73 y.o. female with Ductal carcinoma in situ (DCIS) of right breast, Pathologic: Stage 0 (pTis (DCIS), pN0, cM0, G2, ER+, OR: Not Assessed, HER2: Not Assessed).  She is status post right partial mastectomy.     PLAN: I had a long conversation with the patient detailing the role of radiation in the management of this newly diagnosed ductal carcinoma in situ.  The side effects of radiation discussed included but were not limited to skin irritation with possible breakdown, fatigue, possible poor cosmetic outcome, rib fragility, pulmonary toxicity and the possibility of a secondary malignancy induced by the radiation.  She voiced " understanding and wishes to proceed.  Her son was present for the consultation via the telephone.  I have given her a simulation time for next week and likely will start her radiation after the first of the year.      NCCN Guidelines were applicable to guide this patients treatment plan.   Issac Benson RN

## 2023-12-08 ENCOUNTER — ANCILLARY PROCEDURE (OUTPATIENT)
Dept: RADIOLOGY | Facility: CLINIC | Age: 73
End: 2023-12-08
Payer: MEDICARE

## 2023-12-08 DIAGNOSIS — D05.11 DUCTAL CARCINOMA IN SITU (DCIS) OF RIGHT BREAST: ICD-10-CM

## 2023-12-08 PROCEDURE — 77334 RADIATION TREATMENT AID(S): CPT | Performed by: RADIOLOGY

## 2023-12-08 PROCEDURE — 77263 THER RADIOLOGY TX PLNG CPLX: CPT | Performed by: RADIOLOGY

## 2023-12-08 PROCEDURE — 77290 THER RAD SIMULAJ FIELD CPLX: CPT | Performed by: RADIOLOGY

## 2023-12-12 ENCOUNTER — HOSPITAL ENCOUNTER (OUTPATIENT)
Dept: RADIATION ONCOLOGY | Facility: CLINIC | Age: 73
Setting detail: RADIATION/ONCOLOGY SERIES
Discharge: HOME | End: 2023-12-12
Payer: MEDICARE

## 2023-12-12 PROCEDURE — 77295 3-D RADIOTHERAPY PLAN: CPT | Performed by: RADIOLOGY

## 2023-12-12 PROCEDURE — 77336 RADIATION PHYSICS CONSULT: CPT | Performed by: RADIOLOGY

## 2023-12-12 PROCEDURE — 77300 RADIATION THERAPY DOSE PLAN: CPT | Performed by: RADIOLOGY

## 2023-12-12 PROCEDURE — 77334 RADIATION TREATMENT AID(S): CPT | Performed by: RADIOLOGY

## 2023-12-19 ENCOUNTER — APPOINTMENT (OUTPATIENT)
Dept: RADIATION ONCOLOGY | Facility: CLINIC | Age: 73
End: 2023-12-19
Payer: MEDICARE

## 2023-12-20 ENCOUNTER — APPOINTMENT (OUTPATIENT)
Dept: RADIATION ONCOLOGY | Facility: CLINIC | Age: 73
End: 2023-12-20
Payer: MEDICARE

## 2023-12-21 ENCOUNTER — APPOINTMENT (OUTPATIENT)
Dept: RADIATION ONCOLOGY | Facility: CLINIC | Age: 73
End: 2023-12-21
Payer: MEDICARE

## 2023-12-22 ENCOUNTER — APPOINTMENT (OUTPATIENT)
Dept: RADIATION ONCOLOGY | Facility: CLINIC | Age: 73
End: 2023-12-22
Payer: MEDICARE

## 2023-12-26 ENCOUNTER — APPOINTMENT (OUTPATIENT)
Dept: RADIATION ONCOLOGY | Facility: CLINIC | Age: 73
End: 2023-12-26
Payer: MEDICARE

## 2024-01-03 ENCOUNTER — HOSPITAL ENCOUNTER (OUTPATIENT)
Dept: RADIATION ONCOLOGY | Facility: CLINIC | Age: 74
Setting detail: RADIATION/ONCOLOGY SERIES
Discharge: HOME | End: 2024-01-03
Payer: MEDICARE

## 2024-01-03 DIAGNOSIS — D05.11 INTRADUCTAL CARCINOMA IN SITU OF RIGHT BREAST: ICD-10-CM

## 2024-01-03 LAB
RAD ONC MSQ ACTUAL FRACTIONS DELIVERED: 1
RAD ONC MSQ ACTUAL SESSION DELIVERED DOSE: 520 CGRAY
RAD ONC MSQ ACTUAL TOTAL DOSE: 520 CGRAY
RAD ONC MSQ ELAPSED DAYS: 0
RAD ONC MSQ LAST DATE: NORMAL
RAD ONC MSQ PRESCRIBED FRACTIONAL DOSE: 520 CGRAY
RAD ONC MSQ PRESCRIBED NUMBER OF FRACTIONS: 5
RAD ONC MSQ PRESCRIBED TECHNIQUE: NORMAL
RAD ONC MSQ PRESCRIBED TOTAL DOSE: 2600 CGRAY
RAD ONC MSQ PRESCRIPTION PATTERN COMMENT: NORMAL
RAD ONC MSQ START DATE: NORMAL
RAD ONC MSQ TREATMENT COURSE NUMBER: 1
RAD ONC MSQ TREATMENT SITE: NORMAL

## 2024-01-03 PROCEDURE — 77280 THER RAD SIMULAJ FIELD SMPL: CPT | Performed by: RADIOLOGY

## 2024-01-03 PROCEDURE — 77412 RADIATION TX DELIVERY LVL 3: CPT | Performed by: RADIOLOGY

## 2024-01-04 ENCOUNTER — HOSPITAL ENCOUNTER (OUTPATIENT)
Dept: RADIATION ONCOLOGY | Facility: CLINIC | Age: 74
Setting detail: RADIATION/ONCOLOGY SERIES
Discharge: HOME | End: 2024-01-04
Payer: MEDICARE

## 2024-01-04 DIAGNOSIS — D05.11 INTRADUCTAL CARCINOMA IN SITU OF RIGHT BREAST: ICD-10-CM

## 2024-01-04 DIAGNOSIS — Z51.0 ENCOUNTER FOR ANTINEOPLASTIC RADIATION THERAPY: ICD-10-CM

## 2024-01-04 LAB
RAD ONC MSQ ACTUAL FRACTIONS DELIVERED: 2
RAD ONC MSQ ACTUAL SESSION DELIVERED DOSE: 520 CGRAY
RAD ONC MSQ ACTUAL TOTAL DOSE: 1040 CGRAY
RAD ONC MSQ ELAPSED DAYS: 1
RAD ONC MSQ LAST DATE: NORMAL
RAD ONC MSQ PRESCRIBED FRACTIONAL DOSE: 520 CGRAY
RAD ONC MSQ PRESCRIBED NUMBER OF FRACTIONS: 5
RAD ONC MSQ PRESCRIBED TECHNIQUE: NORMAL
RAD ONC MSQ PRESCRIBED TOTAL DOSE: 2600 CGRAY
RAD ONC MSQ PRESCRIPTION PATTERN COMMENT: NORMAL
RAD ONC MSQ START DATE: NORMAL
RAD ONC MSQ TREATMENT COURSE NUMBER: 1
RAD ONC MSQ TREATMENT SITE: NORMAL

## 2024-01-04 PROCEDURE — 77387 GUIDANCE FOR RADJ TX DLVR: CPT | Performed by: RADIOLOGY

## 2024-01-04 PROCEDURE — 77014 CHG CT GUIDANCE RADIATION THERAPY FLDS PLACEMENT: CPT | Performed by: RADIOLOGY

## 2024-01-04 PROCEDURE — 77412 RADIATION TX DELIVERY LVL 3: CPT | Performed by: RADIOLOGY

## 2024-01-05 ENCOUNTER — HOSPITAL ENCOUNTER (OUTPATIENT)
Dept: RADIATION ONCOLOGY | Facility: CLINIC | Age: 74
Setting detail: RADIATION/ONCOLOGY SERIES
Discharge: HOME | End: 2024-01-05
Payer: MEDICARE

## 2024-01-05 ENCOUNTER — RADIATION ONCOLOGY OTV (OUTPATIENT)
Dept: RADIATION ONCOLOGY | Facility: CLINIC | Age: 74
End: 2024-01-05
Payer: MEDICARE

## 2024-01-05 VITALS
RESPIRATION RATE: 18 BRPM | BODY MASS INDEX: 26.8 KG/M2 | TEMPERATURE: 97.6 F | DIASTOLIC BLOOD PRESSURE: 79 MMHG | WEIGHT: 151.24 LBS | SYSTOLIC BLOOD PRESSURE: 145 MMHG | HEART RATE: 83 BPM | OXYGEN SATURATION: 98 %

## 2024-01-05 DIAGNOSIS — Z51.0 ENCOUNTER FOR ANTINEOPLASTIC RADIATION THERAPY: ICD-10-CM

## 2024-01-05 DIAGNOSIS — D05.11 INTRADUCTAL CARCINOMA IN SITU OF RIGHT BREAST: ICD-10-CM

## 2024-01-05 LAB
RAD ONC MSQ ACTUAL FRACTIONS DELIVERED: 3
RAD ONC MSQ ACTUAL SESSION DELIVERED DOSE: 520 CGRAY
RAD ONC MSQ ACTUAL TOTAL DOSE: 1560 CGRAY
RAD ONC MSQ ELAPSED DAYS: 2
RAD ONC MSQ LAST DATE: NORMAL
RAD ONC MSQ PRESCRIBED FRACTIONAL DOSE: 520 CGRAY
RAD ONC MSQ PRESCRIBED NUMBER OF FRACTIONS: 5
RAD ONC MSQ PRESCRIBED TECHNIQUE: NORMAL
RAD ONC MSQ PRESCRIBED TOTAL DOSE: 2600 CGRAY
RAD ONC MSQ PRESCRIPTION PATTERN COMMENT: NORMAL
RAD ONC MSQ START DATE: NORMAL
RAD ONC MSQ TREATMENT COURSE NUMBER: 1
RAD ONC MSQ TREATMENT SITE: NORMAL

## 2024-01-05 PROCEDURE — 77014 CHG CT GUIDANCE RADIATION THERAPY FLDS PLACEMENT: CPT | Performed by: RADIOLOGY

## 2024-01-05 PROCEDURE — 77387 GUIDANCE FOR RADJ TX DLVR: CPT | Performed by: RADIOLOGY

## 2024-01-05 PROCEDURE — 77427 RADIATION TX MANAGEMENT X5: CPT | Performed by: RADIOLOGY

## 2024-01-05 PROCEDURE — 77412 RADIATION TX DELIVERY LVL 3: CPT | Performed by: RADIOLOGY

## 2024-01-05 ASSESSMENT — PAIN SCALES - GENERAL: PAINLEVEL: 0-NO PAIN

## 2024-01-05 NOTE — PROGRESS NOTES
Radiation Oncology On Treatment Visit    Patient Name:  Lexx Mitchell  MRN:  [id number]  :  1950    Referring Provider: No ref. provider found  Primary Care Provider: Vanessa Quinn MD MPH  Care Team: Patient Care Team:  Vanessa Quinn MD MPH as PCP - General (Internal Medicine/Pediatrics)  Gabbi Tran MD as PCP - [employer] PCP  Deshawn Bedolla MD as Consulting Physician (Hematology and Oncology)    Date of Service: 2024     Diagnosis:   Specialty Problems    None    Treatment Summary:  Radiation Treatments       Active   Right breast (Started on 1/3/2024)   Most recent fraction: 520 cGy given on 2024   Total given: 1,560 cGy / 2,600 cGy  (3 of 5 fractions)   Elapsed Days: 2   Technique: Opposed Tangential           Completed   No historical radiation treatments to show.             SUBJECTIVE: No complaints.        OBJECTIVE:   Vital Signs:  /79 (BP Location: Right arm, Patient Position: Sitting, BP Cuff Size: Adult)   Pulse 83   Temp 36.4 °C (97.6 °F) (Temporal)   Resp 18   Wt 68.6 kg (151 lb 3.8 oz)   SpO2 98%   BMI 26.80 kg/m²    Pain Scale: The patient's current pain level was assessed.  They report currently having a pain of 0 out of 10.    Other Pertinent Findings: No erythema, skin intact.    Toxicity Assessment          2024    14:35   Toxicity Assessment   Adverse Events Reviewed (WDL) No (Exceptions to WDL)   Treatment Site Breast   Anorexia Grade 0   Anxiety Grade 0   Dehydration Grade 0   Depression Grade 0   Dermatitis Radiation Grade 0       given aquaphor and skintegrity with instructions of use   Diarrhea Grade 0   Fatigue Grade 0   Fibrosis Deep Connective Tissue Grade 0   Fracture Grade 0   Nausea Grade 0   Pain Grade 0   Treatment Related Secondary Malignancy Grade 0   Tumor Pain Grade 0   Vomiting Grade 0   Abdominal Pain Grade 0   Dysphagia Grade 0   Esophagitis Grade 0   Gastric Hemorrhage Grade 0   Mucositis Oral Grade 0   Dry Mouth  Grade 0   Breast Infection Grade 0   Seroma Grade 0   Joint Range of Motion Decreased Grade 0   Joint Range of Motion Decreased Lumbar Spine Grade 0   Brachial Plexopathy Grade 0   Breast Atrophy Grade 0   Breast Pain Grade 0   Nipple Deformity Grade 0   Pneumonitis Grade 0   Edema Limbs Grade 0   Lymphedema Grade 0   Hot Flashes Grade 0        Assessment / Plan:  The patient is tolerating radiation therapy as anticipated.  Continue per current treatment plan.   Chart and films reviewed and approved.

## 2024-01-08 ENCOUNTER — HOSPITAL ENCOUNTER (OUTPATIENT)
Dept: RADIATION ONCOLOGY | Facility: CLINIC | Age: 74
Setting detail: RADIATION/ONCOLOGY SERIES
Discharge: HOME | End: 2024-01-08
Payer: MEDICARE

## 2024-01-08 DIAGNOSIS — Z51.0 ENCOUNTER FOR ANTINEOPLASTIC RADIATION THERAPY: ICD-10-CM

## 2024-01-08 DIAGNOSIS — D05.11 INTRADUCTAL CARCINOMA IN SITU OF RIGHT BREAST: ICD-10-CM

## 2024-01-08 LAB
RAD ONC MSQ ACTUAL FRACTIONS DELIVERED: 4
RAD ONC MSQ ACTUAL SESSION DELIVERED DOSE: 520 CGRAY
RAD ONC MSQ ACTUAL TOTAL DOSE: 2080 CGRAY
RAD ONC MSQ ELAPSED DAYS: 5
RAD ONC MSQ LAST DATE: NORMAL
RAD ONC MSQ PRESCRIBED FRACTIONAL DOSE: 520 CGRAY
RAD ONC MSQ PRESCRIBED NUMBER OF FRACTIONS: 5
RAD ONC MSQ PRESCRIBED TECHNIQUE: NORMAL
RAD ONC MSQ PRESCRIBED TOTAL DOSE: 2600 CGRAY
RAD ONC MSQ PRESCRIPTION PATTERN COMMENT: NORMAL
RAD ONC MSQ START DATE: NORMAL
RAD ONC MSQ TREATMENT COURSE NUMBER: 1
RAD ONC MSQ TREATMENT SITE: NORMAL

## 2024-01-08 PROCEDURE — 77014 CHG CT GUIDANCE RADIATION THERAPY FLDS PLACEMENT: CPT | Performed by: RADIOLOGY

## 2024-01-08 PROCEDURE — 77387 GUIDANCE FOR RADJ TX DLVR: CPT | Performed by: RADIOLOGY

## 2024-01-08 PROCEDURE — 77412 RADIATION TX DELIVERY LVL 3: CPT | Performed by: RADIOLOGY

## 2024-01-09 ENCOUNTER — DOCUMENTATION (OUTPATIENT)
Dept: RADIATION ONCOLOGY | Facility: CLINIC | Age: 74
End: 2024-01-09

## 2024-01-09 ENCOUNTER — HOSPITAL ENCOUNTER (OUTPATIENT)
Dept: RADIATION ONCOLOGY | Facility: CLINIC | Age: 74
Setting detail: RADIATION/ONCOLOGY SERIES
Discharge: HOME | End: 2024-01-09
Payer: MEDICARE

## 2024-01-09 DIAGNOSIS — Z51.0 ENCOUNTER FOR ANTINEOPLASTIC RADIATION THERAPY: ICD-10-CM

## 2024-01-09 DIAGNOSIS — D05.11 INTRADUCTAL CARCINOMA IN SITU OF RIGHT BREAST: ICD-10-CM

## 2024-01-09 LAB
RAD ONC MSQ ACTUAL FRACTIONS DELIVERED: 5
RAD ONC MSQ ACTUAL SESSION DELIVERED DOSE: 520 CGRAY
RAD ONC MSQ ACTUAL TOTAL DOSE: 2600 CGRAY
RAD ONC MSQ ELAPSED DAYS: 6
RAD ONC MSQ LAST DATE: NORMAL
RAD ONC MSQ PRESCRIBED FRACTIONAL DOSE: 520 CGRAY
RAD ONC MSQ PRESCRIBED NUMBER OF FRACTIONS: 5
RAD ONC MSQ PRESCRIBED TECHNIQUE: NORMAL
RAD ONC MSQ PRESCRIBED TOTAL DOSE: 2600 CGRAY
RAD ONC MSQ PRESCRIPTION PATTERN COMMENT: NORMAL
RAD ONC MSQ START DATE: NORMAL
RAD ONC MSQ TREATMENT COURSE NUMBER: 1
RAD ONC MSQ TREATMENT SITE: NORMAL

## 2024-01-09 PROCEDURE — 77412 RADIATION TX DELIVERY LVL 3: CPT | Performed by: RADIOLOGY

## 2024-01-09 PROCEDURE — 77014 CHG CT GUIDANCE RADIATION THERAPY FLDS PLACEMENT: CPT | Performed by: RADIOLOGY

## 2024-01-09 PROCEDURE — 77387 GUIDANCE FOR RADJ TX DLVR: CPT | Performed by: RADIOLOGY

## 2024-01-11 NOTE — PROGRESS NOTES
Radiation Oncology Treatment Summary    Patient Name:  Lexx Mitchell  MRN:  [id number]  :  1950    Referring Provider: Erica Bass MD  Primary Care Provider: Vanessa Quinn MD MPH    Brief History: Lexx Mitchell is a 73 y.o. female with Ductal carcinoma in situ (DCIS) of right breast, Pathologic: Stage 0 (pTis (DCIS), pN0, cM0, G2, ER+, KS: Not Assessed, HER2: Not Assessed).  The patient completed radiotherapy as outlined below.    Radiation Treatment Summary:    Radiation Treatments      Radiation Treatments       Active   No active radiation treatments to show.     Completed   Right breast (Started on 1/3/2024)   Most recent fraction: 520 cGy given on 2024   Total given: 2,600 cGy / 2,600 cGy  (5 of 5 fractions)   Elapsed Days: 6   Technique: Opposed Tangential                             Please see the patient's [facility] chart for further details regarding the radiation plan, including beam energy.    Concurrent Chemotherapy:  Treatment Plans       No treatment plans exist          [facility] Toxicity Overview:   Toxicity Assessment          2024    14:35   Toxicity Assessment   Adverse Events Reviewed (WDL) No (Exceptions to WDL)   Treatment Site Breast   Anorexia Grade 0   Anxiety Grade 0   Dehydration Grade 0   Depression Grade 0   Dermatitis Radiation Grade 0       given aquaphor and skintegrity with instructions of use   Diarrhea Grade 0   Fatigue Grade 0   Fibrosis Deep Connective Tissue Grade 0   Fracture Grade 0   Nausea Grade 0   Pain Grade 0   Treatment Related Secondary Malignancy Grade 0   Tumor Pain Grade 0   Vomiting Grade 0   Abdominal Pain Grade 0   Dysphagia Grade 0   Esophagitis Grade 0   Gastric Hemorrhage Grade 0   Mucositis Oral Grade 0   Dry Mouth Grade 0   Breast Infection Grade 0   Seroma Grade 0   Joint Range of Motion Decreased Grade 0   Joint Range of Motion Decreased Lumbar Spine Grade 0   Brachial Plexopathy Grade 0   Breast Atrophy Grade 0   Breast Pain Grade 0    Nipple Deformity Grade 0   Pneumonitis Grade 0   Edema Limbs Grade 0   Lymphedema Grade 0   Hot Flashes Grade 0     Patient Disposition: Ms. Mitchell will return to the clinic on 2/09/2024 to follow up with Chely Benson CNP.  Ms. Mitchell is instructed to contact the clinic with any concerns prior to the follow-up appointment.

## 2024-01-12 PROBLEM — T30.0 BURN INJURY: Status: ACTIVE | Noted: 2023-04-27

## 2024-01-12 PROBLEM — N18.2 STAGE 2 CHRONIC KIDNEY DISEASE: Status: ACTIVE | Noted: 2023-09-21

## 2024-01-12 PROBLEM — F17.200 NICOTINE DEPENDENCE: Status: ACTIVE | Noted: 2022-12-01

## 2024-01-12 PROBLEM — Z90.11 HISTORY OF RIGHT MASTECTOMY: Status: ACTIVE | Noted: 2024-01-12

## 2024-01-12 PROBLEM — N95.9 MENOPAUSAL AND POSTMENOPAUSAL DISORDER: Status: ACTIVE | Noted: 2023-03-14

## 2024-01-12 PROBLEM — J43.9 EMPHYSEMA LUNG (MULTI): Status: ACTIVE | Noted: 2022-12-01

## 2024-01-12 PROBLEM — D24.2 BENIGN NEOPLASM OF LEFT BREAST: Status: ACTIVE | Noted: 2023-07-19

## 2024-01-12 PROBLEM — I25.10 ATHEROSCLEROTIC HEART DISEASE OF NATIVE CORONARY ARTERY WITHOUT ANGINA PECTORIS: Status: ACTIVE | Noted: 2022-12-01

## 2024-01-12 PROBLEM — F19.21 HISTORY OF SUBSTANCE DEPENDENCE (MULTI): Status: ACTIVE | Noted: 2024-01-12

## 2024-01-12 PROBLEM — E78.6 LIPOPROTEIN DEFICIENCY: Status: ACTIVE | Noted: 2023-03-14

## 2024-01-12 PROBLEM — R91.8 ABNORMAL RADIOLOGIC FINDING OF LUNG FIELD: Status: ACTIVE | Noted: 2022-12-01

## 2024-01-12 PROBLEM — F19.21 H/O: DRUG DEPENDENCY (MULTI): Status: ACTIVE | Noted: 2024-01-12

## 2024-01-12 PROBLEM — D24.1 BENIGN NEOPLASM OF RIGHT BREAST: Status: ACTIVE | Noted: 2023-07-19

## 2024-01-12 PROBLEM — Z72.0 TOBACCO USE: Status: ACTIVE | Noted: 2022-12-01

## 2024-01-12 RX ORDER — MUPIROCIN 20 MG/G
15 OINTMENT TOPICAL EVERY 12 HOURS
COMMUNITY
Start: 2019-04-26 | End: 2024-01-15 | Stop reason: WASHOUT

## 2024-01-12 RX ORDER — LABETALOL HYDROCHLORIDE 5 MG/ML
5 INJECTION, SOLUTION INTRAVENOUS
COMMUNITY
Start: 2023-11-01 | End: 2024-01-15 | Stop reason: WASHOUT

## 2024-01-12 RX ORDER — SODIUM CHLORIDE, SODIUM LACTATE, POTASSIUM CHLORIDE, CALCIUM CHLORIDE 600; 310; 30; 20 MG/100ML; MG/100ML; MG/100ML; MG/100ML
100 INJECTION, SOLUTION INTRAVENOUS
COMMUNITY
Start: 2023-11-01 | End: 2024-01-15 | Stop reason: WASHOUT

## 2024-01-12 RX ORDER — SERTRALINE HYDROCHLORIDE 25 MG/1
TABLET, FILM COATED ORAL
COMMUNITY
Start: 2019-04-11 | End: 2024-02-15 | Stop reason: WASHOUT

## 2024-01-14 NOTE — PROGRESS NOTES
Lexx Mitchell female   1950 73 y.o.   [id number]           HPI  Lexx Mitchell is a 73 y.o. [facility]. 2023 L breast BX 4:00: Fibroadenoma with usual ductal hyperplasia. R breast Bx 9:00: Atypical papillary lesion, bordering on low grade DCIS. 23: R breast excision: DCIS, intermediate grade. Positive posterior and superior margin. ER positive (100%), 2.8cm, Stage pTis (0). 23: R breast re-excision: negative new margins. She completed right breast radiation. She met with Dr Bedolla regarding endocrine therapy however this was prior to her radiation.     BREAST IMAGING: She is following up on probable benign bilateral masses due now for left diagnostic mammogram and bilateral ultrasound.    REPRODUCTIVE HISTORY: menarche age 14, , first birth age 29, she did not breastfeed, OCPs x 5 years, menopause age 44, no HRT, scattered fibroglandular tissue. Remote history of bilateral breast biopsies, benign per patient report, scattered breast tissue     FAMILY CANCER HISTORY:  Sister: breast cancer age 38  Mother: lung cancer      REVIEW OF SYSTEMS    Constitutional:  Negative for appetite change, fatigue, fever and unexpected weight change.   HENT:  Negative for ear pain, hearing loss, nosebleeds, sore throat and trouble swallowing.    Eyes:  Negative for discharge, itching and visual disturbance.   Respiratory:  Negative for cough, chest tightness and shortness of breath.    Cardiovascular:  Negative for chest pain, palpitations and leg swelling.   Breast: as indicated in HPI  Gastrointestinal:  Negative for abdominal pain, constipation, diarrhea and nausea.   Endocrine: Negative for cold intolerance and heat intolerance.   Genitourinary:  Negative for dysuria, frequency, hematuria, pelvic pain and vaginal bleeding.   Musculoskeletal:  Negative for arthralgias, back pain, gait problem, joint swelling and myalgias.   Skin:  Negative for color change and rash.    Allergic/Immunologic: Negative for environmental allergies and food allergies.   Neurological:  Negative for dizziness, tremors, speech difficulty, weakness, numbness and headaches.   Hematological:  Does not bruise/bleed easily.   Psychiatric/Behavioral:  Negative for agitation, dysphoric mood and sleep disturbance. The patient is not nervous/anxious.         MEDICATIONS  Current Outpatient Medications   Medication Instructions    amLODIPine (NORVASC) 5 mg, oral, Daily    anastrozole (ARIMIDEX) 1 mg, oral, Daily, Swallow whole with a drink of water.    ascorbic acid (VITAMIN C ORAL) No dose, route, or frequency recorded.    cholecalciferol (Vitamin D-3) 50 mcg (2,000 unit) capsule Vitamin D3 50 MCG (2000 UT) Oral Capsule   Refills: 0       Active    lisinopril 2.5 mg, oral, Daily    multivitamin tablet 1 tablet, oral, Daily    sertraline (Zoloft) 25 mg tablet oral, Daily RT        ALLERGIES  No Known Allergies     SOCIAL HISTORY    Family History   Problem Relation Name Age of Onset    Other (Aortic Valve Disorder) Mother      Lung cancer Father      Breast cancer Sister      Diabetes Other           Social History     Tobacco Use    Smoking status: Former     Packs/day: 0.50     Years: 20.00     Additional pack years: 0.00     Total pack years: 10.00     Types: Cigarettes     Quit date: 2023     Years since quittin.8     Passive exposure: Never    Smokeless tobacco: Never   Substance Use Topics    Alcohol use: Never        VITALS  Vitals:    01/15/24 1304   BP: 143/85   Pulse: 91        PHYSICAL EXAM  Patient is alert and oriented x3, with appropriate mood. The gait is steady and hand grasps are equal. Sclera clear. The breasts are nearly symmetrical. The right lateral breast with healed incision and slight hyperpigmentation from radiation therapy, mild thickening in lumpectomy bed. The left tissue is soft without palpable abnormalities, discrete nodules or masses. The skin and nipples appear normal.  There is no cervical, supraclavicular, or axillary lymphadenopathy palpable. Heart rate and rhythm normal, S1 and S2 appreciated. The lungs are clear bilaterally. Abdomen is soft & non-tender.    Physical Exam  Chest:              IMAGING      Time was spent viewing digital images of the radiology testing with the patient. I explained the results in depth, along with suggested explanation for follow up recommendations based on the testing results.          ORDERS  Orders Placed This Encounter   Procedures    BI mammo left diagnostic tomosynthesis     Standing Status:   Future     Standing Expiration Date:   3/14/2025     Order Specific Question:   Reason for exam:     Answer:   short term follow up     Order Specific Question:   Radiologist to Determine Optimal Study     Answer:   Yes     Order Specific Question:   Release result to MyChart     Answer:   Immediate [1]     Order Specific Question:   Is this exam part of a Research Study? If Yes, link this order to the research study     Answer:   No    BI US breast limited left     Standing Status:   Future     Standing Expiration Date:   3/14/2025     Order Specific Question:   Reason for exam:     Answer:   follow up     Order Specific Question:   Radiologist to Determine Optimal Study     Answer:   Yes     Order Specific Question:   Release result to MyChart     Answer:   Immediate [1]     Order Specific Question:   Is this exam part of a Research Study? If Yes, link this order to the research study     Answer:   No    BI US breast limited right     Standing Status:   Future     Standing Expiration Date:   3/14/2025     Order Specific Question:   Reason for exam:     Answer:   follow up     Order Specific Question:   Radiologist to Determine Optimal Study     Answer:   Yes     Order Specific Question:   Release result to MyChart     Answer:   Immediate     Order Specific Question:   Is this exam part of a Research Study? If Yes, link this order to the research  study     Answer:   No    Referral to Genetics     Standing Status:   Future     Standing Expiration Date:   7/15/2024     Referral Priority:   Routine     Referral Type:   Consultation     Referral Reason:   Specialty Services Required     Requested Specialty:   Genetics     Number of Visits Requested:   1           ASSESSMENT/PLAN  1. Abnormal finding on breast imaging  BI mammo left diagnostic tomosynthesis    BI US breast limited left    BI US breast limited right      2. Encounter for follow-up surveillance of breast cancer  Referral to Genetics    anastrozole (Arimidex) 1 mg tablet           Follow up in about 6 months (around 7/15/2024), or for medication check. Start Anastrozole 1mg daily, proceed to left mammogram and bilateral ultrasound. Return to genetics.       Cassy Rushing, [facility]

## 2024-01-15 ENCOUNTER — OFFICE VISIT (OUTPATIENT)
Dept: SURGICAL ONCOLOGY | Facility: CLINIC | Age: 74
End: 2024-01-15
Payer: MEDICARE

## 2024-01-15 VITALS
HEART RATE: 91 BPM | BODY MASS INDEX: 25.67 KG/M2 | HEIGHT: 64 IN | WEIGHT: 150.35 LBS | DIASTOLIC BLOOD PRESSURE: 85 MMHG | SYSTOLIC BLOOD PRESSURE: 143 MMHG

## 2024-01-15 DIAGNOSIS — Z08 ENCOUNTER FOR FOLLOW-UP SURVEILLANCE OF BREAST CANCER: ICD-10-CM

## 2024-01-15 DIAGNOSIS — Z85.3 ENCOUNTER FOR FOLLOW-UP SURVEILLANCE OF BREAST CANCER: ICD-10-CM

## 2024-01-15 DIAGNOSIS — R92.8 ABNORMAL FINDING ON BREAST IMAGING: Primary | ICD-10-CM

## 2024-01-15 PROCEDURE — 99214 OFFICE O/P EST MOD 30 MIN: CPT | Performed by: NURSE PRACTITIONER

## 2024-01-15 PROCEDURE — 1036F TOBACCO NON-USER: CPT | Performed by: NURSE PRACTITIONER

## 2024-01-15 PROCEDURE — 3079F DIAST BP 80-89 MM HG: CPT | Performed by: NURSE PRACTITIONER

## 2024-01-15 PROCEDURE — 3077F SYST BP >= 140 MM HG: CPT | Performed by: NURSE PRACTITIONER

## 2024-01-15 PROCEDURE — 1159F MED LIST DOCD IN RCRD: CPT | Performed by: NURSE PRACTITIONER

## 2024-01-15 PROCEDURE — 1126F AMNT PAIN NOTED NONE PRSNT: CPT | Performed by: NURSE PRACTITIONER

## 2024-01-15 RX ORDER — ANASTROZOLE 1 MG/1
1 TABLET ORAL DAILY
Qty: 30 TABLET | Refills: 11 | Status: SHIPPED | OUTPATIENT
Start: 2024-01-15 | End: 2025-01-09

## 2024-01-15 ASSESSMENT — PAIN SCALES - GENERAL: PAINLEVEL: 0-NO PAIN

## 2024-01-18 ENCOUNTER — ANCILLARY PROCEDURE (OUTPATIENT)
Dept: RADIOLOGY | Facility: CLINIC | Age: 74
End: 2024-01-18
Payer: MEDICARE

## 2024-01-18 DIAGNOSIS — R92.8 ABNORMAL FINDING ON BREAST IMAGING: ICD-10-CM

## 2024-01-18 PROCEDURE — 77066 DX MAMMO INCL CAD BI: CPT | Performed by: RADIOLOGY

## 2024-01-18 PROCEDURE — 76642 ULTRASOUND BREAST LIMITED: CPT | Mod: 50

## 2024-01-18 PROCEDURE — G0279 TOMOSYNTHESIS, MAMMO: HCPCS | Performed by: RADIOLOGY

## 2024-01-18 PROCEDURE — 77062 BREAST TOMOSYNTHESIS BI: CPT

## 2024-01-18 PROCEDURE — 76642 ULTRASOUND BREAST LIMITED: CPT | Performed by: RADIOLOGY

## 2024-01-18 PROCEDURE — 76982 USE 1ST TARGET LESION: CPT | Mod: 50

## 2024-01-19 ENCOUNTER — APPOINTMENT (OUTPATIENT)
Dept: GENETICS | Facility: HOSPITAL | Age: 74
End: 2024-01-19
Payer: MEDICARE

## 2024-01-19 DIAGNOSIS — R92.8 ABNORMAL FINDING ON BREAST IMAGING: Primary | ICD-10-CM

## 2024-02-08 DIAGNOSIS — Z00.00 ANNUAL PHYSICAL EXAM: Primary | ICD-10-CM

## 2024-02-08 DIAGNOSIS — D64.9 ANEMIA, UNSPECIFIED TYPE: ICD-10-CM

## 2024-02-09 ENCOUNTER — APPOINTMENT (OUTPATIENT)
Dept: RADIATION ONCOLOGY | Facility: CLINIC | Age: 74
End: 2024-02-09
Payer: MEDICARE

## 2024-02-13 ENCOUNTER — LAB (OUTPATIENT)
Dept: LAB | Facility: LAB | Age: 74
End: 2024-02-13
Payer: MEDICARE

## 2024-02-13 DIAGNOSIS — Z00.00 ANNUAL PHYSICAL EXAM: ICD-10-CM

## 2024-02-13 DIAGNOSIS — D64.9 ANEMIA, UNSPECIFIED TYPE: ICD-10-CM

## 2024-02-13 LAB
ALBUMIN SERPL BCP-MCNC: 4.2 G/DL (ref 3.4–5)
ALP SERPL-CCNC: 66 U/L (ref 33–136)
ALT SERPL W P-5'-P-CCNC: 16 U/L (ref 7–45)
ANION GAP SERPL CALC-SCNC: 12 MMOL/L (ref 10–20)
AST SERPL W P-5'-P-CCNC: 18 U/L (ref 9–39)
BILIRUB SERPL-MCNC: 0.3 MG/DL (ref 0–1.2)
BUN SERPL-MCNC: 14 MG/DL (ref 6–23)
CALCIUM SERPL-MCNC: 9.9 MG/DL (ref 8.6–10.6)
CHLORIDE SERPL-SCNC: 110 MMOL/L (ref 98–107)
CHOLEST SERPL-MCNC: 228 MG/DL (ref 0–199)
CHOLESTEROL/HDL RATIO: 6.4
CO2 SERPL-SCNC: 27 MMOL/L (ref 21–32)
CREAT SERPL-MCNC: 0.92 MG/DL (ref 0.5–1.05)
EGFRCR SERPLBLD CKD-EPI 2021: 67 ML/MIN/1.73M*2
ERYTHROCYTE [DISTWIDTH] IN BLOOD BY AUTOMATED COUNT: 18 % (ref 11.5–14.5)
EST. AVERAGE GLUCOSE BLD GHB EST-MCNC: 131 MG/DL
GLUCOSE SERPL-MCNC: 99 MG/DL (ref 74–99)
HBA1C MFR BLD: 6.2 %
HCT VFR BLD AUTO: 40.7 % (ref 36–46)
HDLC SERPL-MCNC: 35.7 MG/DL
HGB BLD-MCNC: 12.8 G/DL (ref 12–16)
LDLC SERPL CALC-MCNC: 163 MG/DL
MCH RBC QN AUTO: 25.1 PG (ref 26–34)
MCHC RBC AUTO-ENTMCNC: 31.4 G/DL (ref 32–36)
MCV RBC AUTO: 80 FL (ref 80–100)
NON HDL CHOLESTEROL: 192 MG/DL (ref 0–149)
NRBC BLD-RTO: 0 /100 WBCS (ref 0–0)
PLATELET # BLD AUTO: 266 X10*3/UL (ref 150–450)
POTASSIUM SERPL-SCNC: 4.3 MMOL/L (ref 3.5–5.3)
PROT SERPL-MCNC: 7.5 G/DL (ref 6.4–8.2)
RBC # BLD AUTO: 5.1 X10*6/UL (ref 4–5.2)
SODIUM SERPL-SCNC: 145 MMOL/L (ref 136–145)
TRIGL SERPL-MCNC: 145 MG/DL (ref 0–149)
VLDL: 29 MG/DL (ref 0–40)
WBC # BLD AUTO: 4.6 X10*3/UL (ref 4.4–11.3)

## 2024-02-13 PROCEDURE — 80061 LIPID PANEL: CPT

## 2024-02-13 PROCEDURE — 36415 COLL VENOUS BLD VENIPUNCTURE: CPT

## 2024-02-13 PROCEDURE — 80053 COMPREHEN METABOLIC PANEL: CPT

## 2024-02-13 PROCEDURE — 85027 COMPLETE CBC AUTOMATED: CPT

## 2024-02-13 PROCEDURE — 83036 HEMOGLOBIN GLYCOSYLATED A1C: CPT

## 2024-02-15 ENCOUNTER — OFFICE VISIT (OUTPATIENT)
Dept: PRIMARY CARE | Facility: CLINIC | Age: 74
End: 2024-02-15
Payer: MEDICARE

## 2024-02-15 ENCOUNTER — APPOINTMENT (OUTPATIENT)
Dept: GENETICS | Facility: CLINIC | Age: 74
End: 2024-02-15
Payer: MEDICARE

## 2024-02-15 VITALS
OXYGEN SATURATION: 98 % | HEART RATE: 73 BPM | BODY MASS INDEX: 25.27 KG/M2 | SYSTOLIC BLOOD PRESSURE: 138 MMHG | WEIGHT: 148 LBS | HEIGHT: 64 IN | DIASTOLIC BLOOD PRESSURE: 80 MMHG

## 2024-02-15 DIAGNOSIS — Z12.11 COLON CANCER SCREENING: Primary | ICD-10-CM

## 2024-02-15 PROBLEM — T21.21XA SECOND DEGREE BURN OF CHEST WALL: Status: RESOLVED | Noted: 2023-04-27 | Resolved: 2024-02-15

## 2024-02-15 PROBLEM — F19.21 H/O: DRUG DEPENDENCY (MULTI): Status: RESOLVED | Noted: 2024-01-12 | Resolved: 2024-02-15

## 2024-02-15 PROBLEM — T30.0 BURN INJURY: Status: RESOLVED | Noted: 2023-04-27 | Resolved: 2024-02-15

## 2024-02-15 PROBLEM — T20.20XA SECOND DEGREE BURN OF FACE: Status: RESOLVED | Noted: 2023-04-27 | Resolved: 2024-02-15

## 2024-02-15 PROCEDURE — 1170F FXNL STATUS ASSESSED: CPT | Performed by: STUDENT IN AN ORGANIZED HEALTH CARE EDUCATION/TRAINING PROGRAM

## 2024-02-15 PROCEDURE — 1036F TOBACCO NON-USER: CPT | Performed by: STUDENT IN AN ORGANIZED HEALTH CARE EDUCATION/TRAINING PROGRAM

## 2024-02-15 PROCEDURE — 1160F RVW MEDS BY RX/DR IN RCRD: CPT | Performed by: STUDENT IN AN ORGANIZED HEALTH CARE EDUCATION/TRAINING PROGRAM

## 2024-02-15 PROCEDURE — 99213 OFFICE O/P EST LOW 20 MIN: CPT | Performed by: STUDENT IN AN ORGANIZED HEALTH CARE EDUCATION/TRAINING PROGRAM

## 2024-02-15 PROCEDURE — 1126F AMNT PAIN NOTED NONE PRSNT: CPT | Performed by: STUDENT IN AN ORGANIZED HEALTH CARE EDUCATION/TRAINING PROGRAM

## 2024-02-15 PROCEDURE — 3075F SYST BP GE 130 - 139MM HG: CPT | Performed by: STUDENT IN AN ORGANIZED HEALTH CARE EDUCATION/TRAINING PROGRAM

## 2024-02-15 PROCEDURE — 1159F MED LIST DOCD IN RCRD: CPT | Performed by: STUDENT IN AN ORGANIZED HEALTH CARE EDUCATION/TRAINING PROGRAM

## 2024-02-15 PROCEDURE — 3079F DIAST BP 80-89 MM HG: CPT | Performed by: STUDENT IN AN ORGANIZED HEALTH CARE EDUCATION/TRAINING PROGRAM

## 2024-02-15 ASSESSMENT — ENCOUNTER SYMPTOMS
LOSS OF SENSATION IN FEET: 0
OCCASIONAL FEELINGS OF UNSTEADINESS: 0
DEPRESSION: 0

## 2024-02-15 NOTE — PATIENT INSTRUCTIONS
Thank you for coming today Lexx!    Your skin lesions are seborrheic keratoses.    I have reordered the colonoscopy. Please schedule your colonoscopy by calling [phone].     For your bones:  You should consume a total of 1200mg of calcium daily through diet and supplements. Take calcium supplements with meals.  A serving of dairy is about 300mg of calcium, so 2 servings of dairy and one 600mg tablet will get you to goal!     Calcium carbonate can cause constipation so watch for this. Calcium citrate causes less constipation. You can check if calcium is carbonate or citrate by looking at labels at the pharmacy or asking a pharmacist.      You should also take 800-2000 I.U. of vitamin D every day.      As a point of reference, [id number] contains 600 mg of calcium and 200 I.U. vitamin D.  A Centrum Silver contains 200 mg Calcium and 400 I.U. vitamin D.      1 cup of milk or yogurt contains 300 mg calcium. 1 oz cheese or cottage cheese contains 200 mg. Calcium-fortified orange juice contains 300 mg per 8 oz.      Try to focus on foods to get the 1200 mg recommended calcium and if needed add that calcium supplement.      Many patients take a separate vitamin D that is bought over the counter daily, again at the 800-2000 I.U. dose.

## 2024-02-15 NOTE — PROGRESS NOTES
"Subjective   Patient ID: Lexx Mitchell is a 73 y.o. female who presents for follow-up.        HPI  Chronic issues:  #Breast cancer  -Ductal carcinoma in situ (DCIS) of right breast, Pathologic: Stage 0 (pTis (DCIS), pN0, cM0, G2, ER+   -Completed excision x2 and radiation   -Currently on anastrozole 1mg daily-- started January 2024    #HTN  -On lisinopril 2.5 mg daily and amlodipine 5mg daily   -Calcium score 0 on 5/3/2021  -Has elevated ASCVD risk of 9.2- did not tolerate statin  BP good today    Health maintenance:  -Due for colonoscopy   -Lung cancer screening in Dec 2022 showed scattered pulmonary nodules measuring up to 5mm- recommended follow up in 12 months  -Shingles, pneumonia, Tdap (2023) vaccines up to date, flu vaccine given 11/2023  -DEXA 4/24/21 showed osteoporosis of spine, osteopenia elsewhere      Social history:  -Lives alone  -Sister, aunt, cousin in [place]  -Sister in [place]   -Quit smoking in February 2023-  -Occasional alcohol once a month  -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  -Gardening, yoga, walking     Family history:  -2 sisters with DM         Objective   Visit Vitals  /80   Pulse 73   Ht 1.626 m (5' 4\")   Wt 67.1 kg (148 lb)   SpO2 98%   BMI 25.40 kg/m²   OB Status Postmenopausal   Smoking Status Former   BSA 1.74 m²      Physical Exam  General: Well appearing, conversational, in no acute distress  HEENT: EOMI, PERRL, MMM  CV: RRR, no murmurs  Chest: Well healed breast excision mid axillary line on right  Resp: Lungs CTAB, normal work of breathing  GI: Soft, nondistended, nontender, BS+   Ext: No lower ext swelling  Skin: Warm, dry, incision lateral to right breast healing well, scattered seborrheic keratoses   Neuro: Awake, alert, oriented x3, moving all 4 extremities, nonfocal, normal gait, ambulates without assistance  Psych: Appropriate mood and affect      Assessment/Plan   Lexx Mitchell is a 73 y.o. female with DCIS of R breast s/p excision and radiation now on " anastrozole and HTN who presents for follow-up.    Chronic issues:  #HTN  -On lisinopril 2.5 mg daily and amlodipine 5mg daily   -Calcium score 0 on 5/3/2021  -Has elevated ASCVD risk of 9.2- did not tolerate statin  BP good today  -Will repeat calcium score on 3/11/24    #DCIS of R breast  -Awaiting path results from 11/1 from re-excision  -Has appt on 11/16  -s/p radiation  -On anastrozole 1mg daily starting in January 2024    Health maintenance:  -Due for colonoscopy - ordered   -Lung cancer screening in Dec 2022 showed scattered pulmonary nodules measuring up to 5mm- recommended follow up in 12 months  -Shingles, pneumonia, Tdap (2023) vaccines up to date, received flu shot  -DEXA 4/24/21 showed osteoporosis of spine, osteopenia elsewhere-- will repeat as patient will likely be introduced to new risk factors with treatment plan for breast cancer-- seems like bisphosphonate were discussed in past and patient declined-- will discuss pending the results of this DEXA  -Repeat CT calcium score, did  not tolerate statin well      Problem List Items Addressed This Visit    None  Visit Diagnoses       Colon cancer screening    -  Primary    Relevant Orders    Colonoscopy Screening; Average Risk Patient          Vanessa Quinn MD MPH

## 2024-02-18 PROBLEM — Z72.0 TOBACCO USE: Status: RESOLVED | Noted: 2022-12-01 | Resolved: 2024-02-18

## 2024-02-18 PROBLEM — Z85.3 HISTORY OF MALIGNANT NEOPLASM OF BREAST: Status: ACTIVE | Noted: 2024-02-18

## 2024-02-18 PROBLEM — F17.200 NICOTINE DEPENDENCE: Status: RESOLVED | Noted: 2022-12-01 | Resolved: 2024-02-18

## 2024-02-18 ASSESSMENT — ACTIVITIES OF DAILY LIVING (ADL)
MANAGING_FINANCES: INDEPENDENT
GROCERY_SHOPPING: INDEPENDENT
TAKING_MEDICATION: INDEPENDENT
DOING_HOUSEWORK: INDEPENDENT
BATHING: INDEPENDENT
DRESSING: INDEPENDENT

## 2024-02-22 ENCOUNTER — APPOINTMENT (OUTPATIENT)
Dept: HEMATOLOGY/ONCOLOGY | Facility: CLINIC | Age: 74
End: 2024-02-22
Payer: MEDICARE

## 2024-02-23 ENCOUNTER — HOSPITAL ENCOUNTER (OUTPATIENT)
Dept: RADIATION ONCOLOGY | Facility: CLINIC | Age: 74
Setting detail: RADIATION/ONCOLOGY SERIES
Discharge: HOME | End: 2024-02-23
Payer: MEDICARE

## 2024-02-23 VITALS
WEIGHT: 148.37 LBS | SYSTOLIC BLOOD PRESSURE: 143 MMHG | RESPIRATION RATE: 18 BRPM | DIASTOLIC BLOOD PRESSURE: 93 MMHG | BODY MASS INDEX: 25.47 KG/M2 | HEART RATE: 91 BPM | TEMPERATURE: 98.2 F | OXYGEN SATURATION: 97 %

## 2024-02-23 DIAGNOSIS — E55.9 VITAMIN D INSUFFICIENCY: Primary | ICD-10-CM

## 2024-02-23 PROCEDURE — 99214 OFFICE O/P EST MOD 30 MIN: CPT | Performed by: NURSE PRACTITIONER

## 2024-02-23 ASSESSMENT — PAIN SCALES - GENERAL: PAINLEVEL: 0-NO PAIN

## 2024-02-26 ENCOUNTER — HOSPITAL ENCOUNTER (OUTPATIENT)
Dept: RADIOLOGY | Facility: CLINIC | Age: 74
Discharge: HOME | End: 2024-02-26
Payer: MEDICARE

## 2024-02-26 DIAGNOSIS — M81.0 OSTEOPOROSIS, UNSPECIFIED OSTEOPOROSIS TYPE, UNSPECIFIED PATHOLOGICAL FRACTURE PRESENCE: ICD-10-CM

## 2024-02-26 PROCEDURE — 77080 DXA BONE DENSITY AXIAL: CPT | Performed by: RADIOLOGY

## 2024-02-26 PROCEDURE — 77080 DXA BONE DENSITY AXIAL: CPT

## 2024-03-11 ENCOUNTER — HOSPITAL ENCOUNTER (OUTPATIENT)
Dept: RADIOLOGY | Facility: CLINIC | Age: 74
Discharge: HOME | End: 2024-03-11
Payer: MEDICARE

## 2024-03-11 DIAGNOSIS — E78.6 HYPERLIPIDEMIA WITH LOW HDL: ICD-10-CM

## 2024-03-11 DIAGNOSIS — E78.5 HYPERLIPIDEMIA WITH LOW HDL: ICD-10-CM

## 2024-03-11 PROCEDURE — 75571 CT HRT W/O DYE W/CA TEST: CPT

## 2024-03-13 DIAGNOSIS — I10 PRIMARY HYPERTENSION: ICD-10-CM

## 2024-03-17 RX ORDER — AMLODIPINE BESYLATE 5 MG/1
5 TABLET ORAL DAILY
Qty: 90 TABLET | Refills: 1 | Status: SHIPPED | OUTPATIENT
Start: 2024-03-17

## 2024-03-28 ENCOUNTER — TELEPHONE (OUTPATIENT)
Dept: PRIMARY CARE | Facility: CLINIC | Age: 74
End: 2024-03-28
Payer: MEDICARE

## 2024-07-10 ENCOUNTER — APPOINTMENT (OUTPATIENT)
Dept: RADIOLOGY | Facility: CLINIC | Age: 74
End: 2024-07-10
Payer: MEDICARE

## 2024-07-11 NOTE — PROGRESS NOTES
Lexx Mitchell female   1950 74 y.o.   [id number]    BREAST CANCER STAGING  pTis  Chief Complaint    Follow-up          HPI  Lexx Mitchell is a 74 y.o. [facility]. 2023 L breast BX 4:00: Fibroadenoma with usual ductal hyperplasia. R breast Bx 9:00: Atypical papillary lesion, bordering on low grade DCIS. 23: R breast excision: DCIS, intermediate grade. Positive posterior and superior margin. ER positive (100%), 2.8cm, Stage pTis (0). 23: R breast re-excision: negative new margins. She completed right breast radiation 1/3/24 - 24: Ultra-hypofractionated radiation therapy to the right breast consisting of a dose of 26 Gy given in 5 fractions of 5.2 Gy per fraction. 1/15/2024 She started anastrozole 1mg daily.    BREAST IMAGIN2024 bilateral diagnostic mammogram and bilateral ultrasound, BI-RADS Category 3, stable probable benign findings, right 11:00 5 cm from the nipple soft avascular 0.5 x 0.4 x 0.3 cm mass, unchanged. left 11:30 8cm from the nipple, soft avascular mass 1.0 x 0.8 x 0.6cm, unchanged.    REPRODUCTIVE HISTORY: menarche age 14, , first birth age 29, she did not breastfeed, OCPs x 5 years, menopause age 44, no HRT, scattered fibroglandular tissue. Remote history of bilateral breast biopsies, benign per patient report, scattered breast tissue     FAMILY CANCER HISTORY:  Sister: breast cancer age 38  Mother: lung cancer      REVIEW OF SYSTEMS    +constipation and joint stiffness    Constitutional:  Negative for appetite change, fatigue, fever and unexpected weight change.   HENT:  Negative for ear pain, hearing loss, nosebleeds, sore throat and trouble swallowing.    Eyes:  Negative for discharge, itching and visual disturbance.   Respiratory:  Negative for cough, chest tightness and shortness of breath.    Cardiovascular:  Negative for chest pain, palpitations and leg swelling.   Breast: as indicated in HPI  Gastrointestinal:   Negative for abdominal pain, diarrhea and nausea.   Endocrine: Negative for cold intolerance and heat intolerance.   Genitourinary:  Negative for dysuria, frequency, hematuria, pelvic pain and vaginal bleeding.   Musculoskeletal:  Negative for arthralgias, back pain, gait problem, joint swelling and myalgias.   Skin:  Negative for color change and rash.   Allergic/Immunologic: Negative for environmental allergies and food allergies.   Neurological:  Negative for dizziness, tremors, speech difficulty, weakness, numbness and headaches.   Hematological:  Does not bruise/bleed easily.   Psychiatric/Behavioral:  Negative for agitation, dysphoric mood and sleep disturbance. The patient is not nervous/anxious.         MEDICATIONS  Current Outpatient Medications   Medication Instructions    amLODIPine (NORVASC) 5 mg, oral, Daily    anastrozole (ARIMIDEX) 1 mg, oral, Daily, Swallow whole with a drink of water.    ascorbic acid (VITAMIN C ORAL) No dose, route, or frequency recorded.    cholecalciferol (Vitamin D-3) 50 mcg (2,000 unit) capsule Vitamin D3 50 MCG (2000 UT) Oral Capsule   Refills: 0       Active    lisinopril 2.5 mg, oral, Daily    multivitamin tablet 1 tablet, oral, Daily        ALLERGIES  No Known Allergies     SOCIAL HISTORY    Family History   Problem Relation Name Age of Onset    Other (Aortic Valve Disorder) Mother Beth     Heart disease Mother Beth     Stroke Mother Beth     Lung cancer Father      Breast cancer Sister Ruth     Diabetes Sister Ruth     Hypertension Sister Ruth     Diabetes Other      Hypertension Sister Al          Social History     Tobacco Use    Smoking status: Former     Current packs/day: 0.00     Average packs/day: 0.3 packs/day for 20.0 years (5.0 ttl pk-yrs)     Types: Cigarettes     Start date: 2003     Quit date: 2023     Years since quittin.3     Passive exposure: Never    Smokeless tobacco: Never   Substance Use Topics    Alcohol use: Never         VITALS  Vitals:    07/12/24 1211   BP: 127/77   Pulse: 72   Resp: 16   Temp: 36.2 °C (97.2 °F)          PHYSICAL EXAM  Patient is alert and oriented x3, with appropriate mood. The gait is steady and hand grasps are equal. Sclera clear. The breasts are nearly symmetrical. The right lateral breast with healed incision and slight hyperpigmentation from radiation therapy, mild thickening in lumpectomy bed. The left tissue is soft without palpable abnormalities, discrete nodules or masses. The skin and nipples appear normal. There is no cervical, supraclavicular, or axillary lymphadenopathy palpable. Heart rate and rhythm normal, S1 and S2 appreciated. The lungs are clear bilaterally. Abdomen is soft & non-tender.    Physical Exam  Chest:              IMAGING  BI US breast limited bilateral 07/12/2024    Narrative  Interpreted By:  Russ Lee and Stephens Katherine  STUDY:  BI US BREAST LIMITED BILATERAL;  7/12/2024 11:30 am    ACCESSION NUMBER(S):  [id number]    ORDERING CLINICIAN:  MARLO MCCOLLUM    INDICATION:  Patient presents for 2nd 6 month follow-up for probably benign  bilateral masses.    COMPARISON:  Diagnostic mammogram and ultrasound 01/18/2024 and 07/14/2023    FINDINGS:  Targeted ultrasound was performed of the right breast at the 11  o'clock position 5 cm from the nipple and left breast at the 11:30  position 8 cm from the nipple by a registered sonographer with  elastography.    In the left breast at 11:30, 8 cm from the nipple, there is  re-demonstration of an oval circumscribed parallel hypoechoic mass  with posterior acoustic shadowing measuring 1.2 x 0.8 x 0.7 cm,  similar to prior exams accounting for differences in technique. It is  avascular and soft on elastography. This is probably benign and has  been stable for 1 year.    In the right breast at the 11 o'clock position 5 cm from the nipple  the previously described mass is no longer visualized. Images reveal  normal fibroglandular tissue  without focal abnormality. The tissue is  soft on elastography.    Impression  Stable appearance of probably benign left breast mass. Final follow  up is recommended in 12 months. Please note that patient is due for  annual screening in 6 months.    The previously described right breast mass is no longer visualized.  No further follow-up is required.      BI-RADS Category:  3 Probably Benign.  Recommendation:  Short-term Interval Follow-up Imaging.  Recommended Date:  1 Year.  Laterality:  Left.      Time was spent viewing digital images of the radiology testing with the patient. I explained the results in depth, along with suggested explanation for follow up recommendations based on the testing results.          ORDERS  Orders Placed This Encounter   Procedures    BI mammo bilateral screening tomosynthesis     Standing Status:   Future     Standing Expiration Date:   9/12/2025     Order Specific Question:   Perform a breast ultrasound if clinically indicated by Radiologist?     Answer:   Yes     Order Specific Question:   Previous Mamm performed at  location?     Answer:   Yes     Order Specific Question:   Reason for exam:     Answer:   .     Order Specific Question:   Radiologist to Determine Optimal Study     Answer:   Yes     Order Specific Question:   Release result to [employer]     Answer:   Immediate     Order Specific Question:   Is this exam part of a Research Study? If Yes, link this order to the research study     Answer:   No    BI US breast limited bilateral     Standing Status:   Future     Standing Expiration Date:   9/12/2025     Order Specific Question:   Reason for exam:     Answer:   bilat     Order Specific Question:   Radiologist to Determine Optimal Study     Answer:   Yes     Order Specific Question:   Release result to [email or web address]     Answer:   Immediate [1]     Order Specific Question:   Is this exam part of a Research Study? If Yes, link this order to the research study     Answer:   No            ASSESSMENT/PLAN  1. Healthcare maintenance  BI mammo bilateral screening tomosynthesis      2. Encounter for follow-up surveillance of breast cancer  Clinic Appointment Request    anastrozole (Arimidex) 1 mg tablet      3. Abnormal finding on breast imaging  BI US breast limited bilateral    Clinic Appointment Request             Follow up in about 6 months (around 1/12/2025), or with clinic screening mammogram and then 12 months with bilateral ultrasound and office visit. Continue Anastrozole 1mg daily. Try Miralax for constipation and see if you qualify for gym for exercise with your medicare.    Cassy Rushing, APRN-[facility]

## 2024-07-12 ENCOUNTER — HOSPITAL ENCOUNTER (OUTPATIENT)
Dept: RADIOLOGY | Facility: CLINIC | Age: 74
Discharge: HOME | End: 2024-07-12
Payer: MEDICARE

## 2024-07-12 ENCOUNTER — OFFICE VISIT (OUTPATIENT)
Dept: SURGICAL ONCOLOGY | Facility: CLINIC | Age: 74
End: 2024-07-12
Payer: MEDICARE

## 2024-07-12 VITALS
SYSTOLIC BLOOD PRESSURE: 127 MMHG | TEMPERATURE: 97.2 F | BODY MASS INDEX: 24.67 KG/M2 | RESPIRATION RATE: 16 BRPM | HEART RATE: 72 BPM | DIASTOLIC BLOOD PRESSURE: 77 MMHG | WEIGHT: 143.74 LBS

## 2024-07-12 DIAGNOSIS — Z00.00 HEALTHCARE MAINTENANCE: Primary | ICD-10-CM

## 2024-07-12 DIAGNOSIS — R92.8 ABNORMAL FINDING ON BREAST IMAGING: ICD-10-CM

## 2024-07-12 DIAGNOSIS — R92.8 OTHER ABNORMAL AND INCONCLUSIVE FINDINGS ON DIAGNOSTIC IMAGING OF BREAST: ICD-10-CM

## 2024-07-12 DIAGNOSIS — Z85.3 ENCOUNTER FOR FOLLOW-UP SURVEILLANCE OF BREAST CANCER: ICD-10-CM

## 2024-07-12 DIAGNOSIS — Z08 ENCOUNTER FOR FOLLOW-UP SURVEILLANCE OF BREAST CANCER: ICD-10-CM

## 2024-07-12 PROCEDURE — 99214 OFFICE O/P EST MOD 30 MIN: CPT | Performed by: NURSE PRACTITIONER

## 2024-07-12 PROCEDURE — 76642 ULTRASOUND BREAST LIMITED: CPT | Mod: 50

## 2024-07-12 PROCEDURE — 76982 USE 1ST TARGET LESION: CPT | Mod: 50

## 2024-07-12 RX ORDER — ANASTROZOLE 1 MG/1
1 TABLET ORAL DAILY
Qty: 90 TABLET | Refills: 3 | Status: SHIPPED | OUTPATIENT
Start: 2024-07-12 | End: 2025-07-07

## 2024-07-12 ASSESSMENT — ENCOUNTER SYMPTOMS
DEPRESSION: 0
OCCASIONAL FEELINGS OF UNSTEADINESS: 0
LOSS OF SENSATION IN FEET: 0

## 2024-07-12 ASSESSMENT — PAIN SCALES - GENERAL: PAINLEVEL: 0-NO PAIN

## 2024-09-10 DIAGNOSIS — I10 HYPERTENSION, ESSENTIAL: ICD-10-CM

## 2024-09-10 RX ORDER — LISINOPRIL 2.5 MG/1
2.5 TABLET ORAL DAILY
Qty: 90 TABLET | Refills: 3 | Status: SHIPPED | OUTPATIENT
Start: 2024-09-10

## 2024-09-13 ENCOUNTER — APPOINTMENT (OUTPATIENT)
Dept: RADIATION ONCOLOGY | Facility: CLINIC | Age: 74
End: 2024-09-13
Payer: MEDICARE

## 2024-09-22 DIAGNOSIS — I10 PRIMARY HYPERTENSION: ICD-10-CM

## 2024-09-23 RX ORDER — AMLODIPINE BESYLATE 5 MG/1
5 TABLET ORAL DAILY
Qty: 90 TABLET | Refills: 1 | Status: SHIPPED | OUTPATIENT
Start: 2024-09-23

## 2025-01-18 NOTE — PROGRESS NOTES
Lexx Mitchell female   1950 74 y.o.   [id number]    BREAST CANCER STAGING  pTis  Chief Complaint    Follow-up            HPI  Lexx Mitchell is a 74 y.o. [facility]. 2023 L breast BX 4:00: Fibroadenoma with usual ductal hyperplasia. R breast Bx 9:00: Atypical papillary lesion, bordering on low grade DCIS. 23: R breast excision: DCIS, intermediate grade. Positive posterior and superior margin. ER positive (100%), 2.8cm, Stage pTis (0). 23: R breast re-excision: negative new margins. She completed right breast radiation 1/3/24 - 24: Ultra-hypofractionated radiation therapy to the right breast consisting of a dose of 26 Gy given in 5 fractions of 5.2 Gy per fraction. 1/15/2024 She started anastrozole 1mg daily.    BREAST IMAGIN2024 bilateral diagnostic mammogram and bilateral ultrasound, BI-RADS Category 3, stable probable benign findings, right 11:00 5 cm from the nipple soft avascular 0.5 x 0.4 x 0.3 cm mass, unchanged. left 11:30 8cm from the nipple, soft avascular mass 1.0 x 0.8 x 0.6cm, unchanged. 2024 bilateral ultrasound, BI-RADS Category 3, stable probable benign left breast mass.    REPRODUCTIVE HISTORY: menarche age 14, , first birth age 29, she did not breastfeed, OCPs x 5 years, menopause age 44, no HRT, scattered fibroglandular tissue. Remote history of bilateral breast biopsies, benign per patient report, scattered breast tissue     FAMILY CANCER HISTORY:  Sister: breast cancer age 38  Mother: lung cancer      REVIEW OF SYSTEMS    +constipation and joint stiffness    Constitutional:  Negative for appetite change, fatigue, fever and unexpected weight change.   HENT:  Negative for ear pain, hearing loss, nosebleeds, sore throat and trouble swallowing.    Eyes:  Negative for discharge, itching and visual disturbance.   Respiratory:  Negative for cough, chest tightness and shortness of breath.    Cardiovascular:  Negative for  chest pain, palpitations and leg swelling.   Breast: as indicated in HPI  Gastrointestinal:  Negative for abdominal pain, diarrhea and nausea.   Endocrine: Negative for cold intolerance and heat intolerance.   Genitourinary:  Negative for dysuria, frequency, hematuria, pelvic pain and vaginal bleeding.   Musculoskeletal:  Negative for arthralgias, back pain, gait problem, joint swelling and myalgias.   Skin:  Negative for color change and rash.   Allergic/Immunologic: Negative for environmental allergies and food allergies.   Neurological:  Negative for dizziness, tremors, speech difficulty, weakness, numbness and headaches.   Hematological:  Does not bruise/bleed easily.   Psychiatric/Behavioral:  Negative for agitation, dysphoric mood and sleep disturbance. The patient is not nervous/anxious.         MEDICATIONS  Current Outpatient Medications   Medication Instructions   • amLODIPine (NORVASC) 5 mg, oral, Daily   • anastrozole (ARIMIDEX) 1 mg, oral, Daily, Swallow whole with a drink of water.   • ascorbic acid (VITAMIN C ORAL) No dose, route, or frequency recorded.   • cholecalciferol (Vitamin D-3) 50 mcg (2,000 unit) capsule Vitamin D3 50 MCG (2000 UT) Oral Capsule   Refills: 0       Active   • lisinopril 2.5 mg, oral, Daily   • multivitamin tablet 1 tablet, Daily        ALLERGIES  No Known Allergies     SOCIAL HISTORY    Family History   Problem Relation Name Age of Onset   • Other (Aortic Valve Disorder) Mother Beth    • Heart disease Mother Beth    • Stroke Mother Beth    • Lung cancer Father     • Breast cancer Sister Ruth    • Diabetes Sister Ruth    • Hypertension Sister Ruth    • Diabetes Other     • Hypertension Sister Al 40         Social History     Tobacco Use   • Smoking status: Former     Current packs/day: 0.00     Average packs/day: 0.3 packs/day for 20.0 years (5.0 ttl pk-yrs)     Types: Cigarettes     Start date: 2003     Quit date: 2023     Years since quittin.9      Passive exposure: Never   • Smokeless tobacco: Never   Substance Use Topics   • Alcohol use: Never        VITALS  Vitals:    01/20/25 1410   BP: 134/85   Pulse: 77   Temp: 36.8 °C (98.2 °F)   SpO2: 100%            PHYSICAL EXAM  Patient is alert and oriented x3, with appropriate mood. The gait is steady and hand grasps are equal. Sclera clear. The breasts are nearly symmetrical. The right lateral breast with healed incision and slight hyperpigmentation from radiation therapy, mild thickening in lumpectomy bed. The left tissue is soft without palpable abnormalities, discrete nodules or masses. The skin and nipples appear normal. There is no cervical, supraclavicular, or axillary lymphadenopathy palpable. Heart rate and rhythm normal, S1 and S2 appreciated. The lungs are clear bilaterally. Abdomen is soft & non-tender.    Physical Exam  Chest:            IMAGING  Screening mammogram today, results are pending.    Time was spent viewing digital images of the radiology testing with the patient.       ORDERS  Orders Placed This Encounter   Procedures   • BI mammo bilateral screening tomosynthesis     Standing Status:   Future     Standing Expiration Date:   3/20/2026     Order Specific Question:   Perform a breast ultrasound if clinically indicated by Radiologist?     Answer:   Yes     Order Specific Question:   Previous Mamm performed at  location?     Answer:   Yes     Order Specific Question:   Reason for exam:     Answer:   .     Order Specific Question:   Radiologist to Determine Optimal Study     Answer:   Yes     Order Specific Question:   Release result to [employer]     Answer:   Immediate     Order Specific Question:   Is this exam part of a Research Study? If Yes, link this order to the research study     Answer:   No           ASSESSMENT/PLAN  1. Encounter for screening mammogram for malignant neoplasm of breast  BI mammo bilateral screening tomosynthesis      2. Encounter for follow-up surveillance of breast  cancer  Clinic Appointment Request    Clinic Appointment Request      3. Abnormal finding on breast imaging  Clinic Appointment Request               Follow up in about 1 year (around 1/20/2026) for with or after recommended imaging. Continue Anastrozole 1mg daily.    Please return one year for mammogram and office visit or sooner if you having any problems or concerns.     You can see your health information, review clinical summaries from office visits & test results online when you follow your health with MY  Chart, a personal health record. To sign up go to [email or web address]. If you need assistance with signing up or trouble getting into your account call [facility] Patient Line 24/7 at [phone].     My office phone number is [phone] if you need to get in touch with me or have additional questions or problems. Thank you for choosing [facility] and trusting me as your healthcare provider. I look forward to seeing you again at your next office visit. I am honored to be a provider on your health care team and I remain dedicated to helping you achieve your health goals.      Cassy Rushing, PAUL-CNP  [facility]

## 2025-01-20 ENCOUNTER — OFFICE VISIT (OUTPATIENT)
Dept: SURGICAL ONCOLOGY | Facility: CLINIC | Age: 75
End: 2025-01-20
Payer: MEDICARE

## 2025-01-20 ENCOUNTER — HOSPITAL ENCOUNTER (OUTPATIENT)
Dept: RADIOLOGY | Facility: CLINIC | Age: 75
Discharge: HOME | End: 2025-01-20
Payer: MEDICARE

## 2025-01-20 VITALS
WEIGHT: 139 LBS | SYSTOLIC BLOOD PRESSURE: 134 MMHG | DIASTOLIC BLOOD PRESSURE: 85 MMHG | BODY MASS INDEX: 23.86 KG/M2 | HEART RATE: 77 BPM | TEMPERATURE: 98.2 F | OXYGEN SATURATION: 100 %

## 2025-01-20 DIAGNOSIS — Z08 ENCOUNTER FOR FOLLOW-UP SURVEILLANCE OF BREAST CANCER: ICD-10-CM

## 2025-01-20 DIAGNOSIS — Z85.3 ENCOUNTER FOR FOLLOW-UP SURVEILLANCE OF BREAST CANCER: ICD-10-CM

## 2025-01-20 DIAGNOSIS — Z12.31 ENCOUNTER FOR SCREENING MAMMOGRAM FOR MALIGNANT NEOPLASM OF BREAST: Primary | ICD-10-CM

## 2025-01-20 DIAGNOSIS — R92.8 ABNORMAL FINDING ON BREAST IMAGING: ICD-10-CM

## 2025-01-20 DIAGNOSIS — Z00.00 HEALTHCARE MAINTENANCE: ICD-10-CM

## 2025-01-20 PROCEDURE — 77063 BREAST TOMOSYNTHESIS BI: CPT

## 2025-01-20 PROCEDURE — 1160F RVW MEDS BY RX/DR IN RCRD: CPT | Performed by: NURSE PRACTITIONER

## 2025-01-20 PROCEDURE — 99214 OFFICE O/P EST MOD 30 MIN: CPT | Performed by: NURSE PRACTITIONER

## 2025-01-20 PROCEDURE — 1036F TOBACCO NON-USER: CPT | Performed by: NURSE PRACTITIONER

## 2025-01-20 PROCEDURE — 3079F DIAST BP 80-89 MM HG: CPT | Performed by: NURSE PRACTITIONER

## 2025-01-20 PROCEDURE — 1159F MED LIST DOCD IN RCRD: CPT | Performed by: NURSE PRACTITIONER

## 2025-01-20 PROCEDURE — 77067 SCR MAMMO BI INCL CAD: CPT | Performed by: STUDENT IN AN ORGANIZED HEALTH CARE EDUCATION/TRAINING PROGRAM

## 2025-01-20 PROCEDURE — 3075F SYST BP GE 130 - 139MM HG: CPT | Performed by: NURSE PRACTITIONER

## 2025-01-20 PROCEDURE — 1126F AMNT PAIN NOTED NONE PRSNT: CPT | Performed by: NURSE PRACTITIONER

## 2025-01-20 PROCEDURE — 77063 BREAST TOMOSYNTHESIS BI: CPT | Performed by: STUDENT IN AN ORGANIZED HEALTH CARE EDUCATION/TRAINING PROGRAM

## 2025-01-20 ASSESSMENT — PAIN SCALES - GENERAL: PAINLEVEL_OUTOF10: 0-NO PAIN

## 2025-03-19 DIAGNOSIS — I10 PRIMARY HYPERTENSION: ICD-10-CM

## 2025-03-19 RX ORDER — AMLODIPINE BESYLATE 5 MG/1
5 TABLET ORAL DAILY
Qty: 90 TABLET | Refills: 3 | Status: SHIPPED | OUTPATIENT
Start: 2025-03-19

## 2025-06-19 ENCOUNTER — APPOINTMENT (OUTPATIENT)
Dept: PRIMARY CARE | Facility: CLINIC | Age: 75
End: 2025-06-19
Payer: MEDICARE

## 2025-06-28 DIAGNOSIS — I10 HYPERTENSION, ESSENTIAL: ICD-10-CM

## 2025-07-02 RX ORDER — LISINOPRIL 2.5 MG/1
2.5 TABLET ORAL DAILY
Qty: 90 TABLET | Refills: 0 | Status: SHIPPED | OUTPATIENT
Start: 2025-07-02

## 2025-08-27 ENCOUNTER — APPOINTMENT (OUTPATIENT)
Dept: PRIMARY CARE | Facility: CLINIC | Age: 75
End: 2025-08-27
Payer: MEDICARE

## 2025-08-27 VITALS
HEART RATE: 82 BPM | SYSTOLIC BLOOD PRESSURE: 169 MMHG | DIASTOLIC BLOOD PRESSURE: 79 MMHG | OXYGEN SATURATION: 97 % | HEIGHT: 64 IN | BODY MASS INDEX: 23.9 KG/M2 | WEIGHT: 140 LBS

## 2025-08-27 DIAGNOSIS — Z87.891 PERSONAL HISTORY OF NICOTINE DEPENDENCE: ICD-10-CM

## 2025-08-27 DIAGNOSIS — E78.5 HYPERLIPIDEMIA WITH LOW HDL: ICD-10-CM

## 2025-08-27 DIAGNOSIS — D64.9 ANEMIA, UNSPECIFIED TYPE: ICD-10-CM

## 2025-08-27 DIAGNOSIS — Z12.12 SCREENING FOR COLORECTAL CANCER: ICD-10-CM

## 2025-08-27 DIAGNOSIS — I10 HYPERTENSION, ESSENTIAL: ICD-10-CM

## 2025-08-27 DIAGNOSIS — E55.9 VITAMIN D INSUFFICIENCY: ICD-10-CM

## 2025-08-27 DIAGNOSIS — Z12.11 SCREENING FOR COLORECTAL CANCER: ICD-10-CM

## 2025-08-27 DIAGNOSIS — R73.09 OTHER ABNORMAL GLUCOSE: ICD-10-CM

## 2025-08-27 DIAGNOSIS — M81.0 AGE-RELATED OSTEOPOROSIS WITHOUT CURRENT PATHOLOGICAL FRACTURE: ICD-10-CM

## 2025-08-27 DIAGNOSIS — Z00.00 ROUTINE GENERAL MEDICAL EXAMINATION AT HEALTH CARE FACILITY: Primary | ICD-10-CM

## 2025-08-27 DIAGNOSIS — F17.200 TOBACCO USE DISORDER: ICD-10-CM

## 2025-08-27 DIAGNOSIS — E78.6 HYPERLIPIDEMIA WITH LOW HDL: ICD-10-CM

## 2025-08-27 PROBLEM — R91.8 LUNG FIELD ABNORMAL: Status: RESOLVED | Noted: 2022-12-01 | Resolved: 2025-08-27

## 2025-08-27 PROBLEM — N95.8 OTHER SPECIFIED MENOPAUSAL AND PERIMENOPAUSAL DISORDERS: Status: RESOLVED | Noted: 2023-03-14 | Resolved: 2025-08-27

## 2025-08-27 PROBLEM — N95.9 MENOPAUSAL AND POSTMENOPAUSAL DISORDER: Status: RESOLVED | Noted: 2023-03-14 | Resolved: 2025-08-27

## 2025-08-27 PROCEDURE — 93000 ELECTROCARDIOGRAM COMPLETE: CPT | Performed by: INTERNAL MEDICINE

## 2025-08-27 PROCEDURE — 99397 PER PM REEVAL EST PAT 65+ YR: CPT | Performed by: INTERNAL MEDICINE

## 2025-08-27 PROCEDURE — 1123F ACP DISCUSS/DSCN MKR DOCD: CPT | Performed by: INTERNAL MEDICINE

## 2025-08-27 PROCEDURE — 3078F DIAST BP <80 MM HG: CPT | Performed by: INTERNAL MEDICINE

## 2025-08-27 PROCEDURE — G0439 PPPS, SUBSEQ VISIT: HCPCS | Performed by: INTERNAL MEDICINE

## 2025-08-27 PROCEDURE — 3008F BODY MASS INDEX DOCD: CPT | Performed by: INTERNAL MEDICINE

## 2025-08-27 PROCEDURE — 1170F FXNL STATUS ASSESSED: CPT | Performed by: INTERNAL MEDICINE

## 2025-08-27 PROCEDURE — 99214 OFFICE O/P EST MOD 30 MIN: CPT | Performed by: INTERNAL MEDICINE

## 2025-08-27 PROCEDURE — 99406 BEHAV CHNG SMOKING 3-10 MIN: CPT | Performed by: INTERNAL MEDICINE

## 2025-08-27 PROCEDURE — 1159F MED LIST DOCD IN RCRD: CPT | Performed by: INTERNAL MEDICINE

## 2025-08-27 PROCEDURE — 3077F SYST BP >= 140 MM HG: CPT | Performed by: INTERNAL MEDICINE

## 2025-08-27 RX ORDER — PSYLLIUM HUSK 0.4 G
CAPSULE ORAL
COMMUNITY

## 2025-08-27 RX ORDER — LATANOPROST 50 UG/ML
SOLUTION/ DROPS OPHTHALMIC
COMMUNITY
Start: 2025-03-09

## 2025-08-27 ASSESSMENT — ENCOUNTER SYMPTOMS
VOICE CHANGE: 0
BRUISES/BLEEDS EASILY: 0
SHORTNESS OF BREATH: 0
DEPRESSION: 0
SEIZURES: 0
ACTIVITY CHANGE: 0
OCCASIONAL FEELINGS OF UNSTEADINESS: 0
ARTHRALGIAS: 0
CHILLS: 0
ABDOMINAL PAIN: 0
VOMITING: 0
HEMATURIA: 0
ADENOPATHY: 0
RHINORRHEA: 0
WEAKNESS: 0
LOSS OF SENSATION IN FEET: 0
FATIGUE: 0
MYALGIAS: 0
SORE THROAT: 0
DECREASED CONCENTRATION: 0
NECK STIFFNESS: 0
SINUS PAIN: 0
HEADACHES: 0
COUGH: 0
NAUSEA: 0
NERVOUS/ANXIOUS: 0
NECK PAIN: 0
SLEEP DISTURBANCE: 0
FEVER: 0
PALPITATIONS: 0
EYE DISCHARGE: 0
DYSURIA: 0
CHEST TIGHTNESS: 0
DIFFICULTY URINATING: 0
APPETITE CHANGE: 0
ABDOMINAL DISTENTION: 0
SINUS PRESSURE: 0
DYSPHORIC MOOD: 0
LIGHT-HEADEDNESS: 0
DIZZINESS: 0
COLOR CHANGE: 0
BACK PAIN: 0
CONSTIPATION: 0
FREQUENCY: 0
HYPERACTIVE: 0
WHEEZING: 0
EYE ITCHING: 0
DIARRHEA: 0

## 2025-08-27 ASSESSMENT — ACTIVITIES OF DAILY LIVING (ADL)
BATHING: INDEPENDENT
DRESSING: INDEPENDENT
MANAGING_FINANCES: INDEPENDENT
DOING_HOUSEWORK: INDEPENDENT
GROCERY_SHOPPING: INDEPENDENT
TAKING_MEDICATION: INDEPENDENT

## 2026-08-27 ENCOUNTER — APPOINTMENT (OUTPATIENT)
Dept: PRIMARY CARE | Facility: CLINIC | Age: 76
End: 2026-08-27
Payer: MEDICARE

## (undated) DEVICE — SUTURE, SILK, 3-0, 30 IN, BR SH, BLACK

## (undated) DEVICE — ELECTRODE, ELECTROSURGICAL, BLADE, INSULATED, ENT/IMA, STERILE

## (undated) DEVICE — SUTURE, ETHILON, 3/0, FS1, 18 IN, BLK MONO

## (undated) DEVICE — ADHESIVE, SKIN, LIQUIBAND EXCEED

## (undated) DEVICE — SOLUTION, IRRIGATION, SODIUM CHLORIDE 0.9%, 1000 ML, POUR BOTTLE

## (undated) DEVICE — Device

## (undated) DEVICE — TOWEL, SURGICAL, NEURO, O/R, 16 X 26, BLUE, STERILE

## (undated) DEVICE — SYRINGE, 10 CC, LUER LOCK

## (undated) DEVICE — TIP, SUCTION, YANKAUER, FLEXIBLE

## (undated) DEVICE — CLIP, LIGATING, HORIZON, MEDIUM, TITANIUM

## (undated) DEVICE — HEMOSTAT, ARISTA, ABSORBABLE, 3 GRAMS

## (undated) DEVICE — GLOVE, SURGICAL, PROTEXIS PI BLUE W/NEUTHERA, 6.5, PF, LF

## (undated) DEVICE — NEEDLE, HYPODERMIC, 25 G X 1.5 IN, A BEVEL, STERILE

## (undated) DEVICE — PAD, GROUNDING, ELECTROSURGICAL, W/9 FT CABLE, POLYHESIVE II, ADULT, LF

## (undated) DEVICE — DRAPE, SHEET, THREE QUARTER, FAN FOLD, 57 X 77 IN

## (undated) DEVICE — SUTURE, MONOCRYL, 3-0, 18 IN, PS2, UNDYED

## (undated) DEVICE — SUTURE, SILK, 2-0, 30 IN, SH, BLACK

## (undated) DEVICE — NEEDLE, HYPODERMIC, REGULAR WALL, REGULAR BEVEL, 20 G X 1.5 IN

## (undated) DEVICE — SUTURE, VICRYL, 3-0, 27 IN, SH

## (undated) DEVICE — DRAPE, SHEET, UTILITY, NON ABSORBENT, 18 X 26 IN, LF

## (undated) DEVICE — GLOVE, SURGICAL, PROTEXIS PI MICRO, 6.0, PF, LF